# Patient Record
Sex: FEMALE | Race: WHITE | NOT HISPANIC OR LATINO | Employment: OTHER | URBAN - NONMETROPOLITAN AREA
[De-identification: names, ages, dates, MRNs, and addresses within clinical notes are randomized per-mention and may not be internally consistent; named-entity substitution may affect disease eponyms.]

---

## 2020-11-09 DIAGNOSIS — Z12.31 ENCOUNTER FOR SCREENING MAMMOGRAM FOR MALIGNANT NEOPLASM OF BREAST: Primary | ICD-10-CM

## 2021-03-01 DIAGNOSIS — Z12.31 ENCOUNTER FOR SCREENING MAMMOGRAM FOR MALIGNANT NEOPLASM OF BREAST: Primary | ICD-10-CM

## 2021-03-01 DIAGNOSIS — Z85.3 HISTORY OF BREAST CANCER: ICD-10-CM

## 2021-03-02 ENCOUNTER — HOSPITAL ENCOUNTER (OUTPATIENT)
Dept: RADIOLOGY | Facility: HOSPITAL | Age: 86
Discharge: HOME | End: 2021-03-02
Attending: INTERNAL MEDICINE
Payer: MEDICARE

## 2021-03-02 VITALS — HEIGHT: 66 IN | BODY MASS INDEX: 18.32 KG/M2 | WEIGHT: 114 LBS

## 2021-03-02 DIAGNOSIS — Z12.31 ENCOUNTER FOR SCREENING MAMMOGRAM FOR MALIGNANT NEOPLASM OF BREAST: ICD-10-CM

## 2021-03-02 DIAGNOSIS — Z85.3 HISTORY OF BREAST CANCER: ICD-10-CM

## 2021-03-02 PROCEDURE — 77067 SCR MAMMO BI INCL CAD: CPT | Mod: TC

## 2021-09-23 ENCOUNTER — HOSPITAL ENCOUNTER (OUTPATIENT)
Dept: RADIOLOGY | Facility: HOSPITAL | Age: 86
Discharge: HOME | End: 2021-09-23
Attending: INTERNAL MEDICINE
Payer: MEDICARE

## 2021-09-23 DIAGNOSIS — K59.00 CN (CONSTIPATION): ICD-10-CM

## 2021-09-23 DIAGNOSIS — K59.00 CN (CONSTIPATION): Primary | ICD-10-CM

## 2021-09-23 PROCEDURE — 74018 RADEX ABDOMEN 1 VIEW: CPT | Mod: TC

## 2021-09-27 ENCOUNTER — HOSPITAL ENCOUNTER (INPATIENT)
Facility: HOSPITAL | Age: 86
LOS: 4 days | Discharge: HOME HEALTH | DRG: 390 | End: 2021-10-01
Attending: INTERNAL MEDICINE | Admitting: INTERNAL MEDICINE
Payer: MEDICARE

## 2021-09-27 DIAGNOSIS — K56.41 FECAL IMPACTION: ICD-10-CM

## 2021-09-27 DIAGNOSIS — Z01.818 PRE-OP EVALUATION: ICD-10-CM

## 2021-09-27 DIAGNOSIS — K56.41 FECAL IMPACTION: Primary | ICD-10-CM

## 2021-09-27 LAB — SARS-COV-2 RDRP RESP QL NAA+PROBE: NEGATIVE

## 2021-09-27 PROCEDURE — U0002 COVID-19 LAB TEST NON-CDC: HCPCS | Performed by: INTERNAL MEDICINE

## 2021-09-27 PROCEDURE — 11000001 HC ACUTE MED/SURG PRIVATE ROOM

## 2021-09-27 PROCEDURE — 63600175 PHARM REV CODE 636 W HCPCS: Performed by: INTERNAL MEDICINE

## 2021-09-27 PROCEDURE — 25000003 PHARM REV CODE 250: Performed by: INTERNAL MEDICINE

## 2021-09-27 PROCEDURE — 25000003 PHARM REV CODE 250: Performed by: SURGERY

## 2021-09-27 RX ORDER — SYRING-NEEDL,DISP,INSUL,0.3 ML 29 G X1/2"
296 SYRINGE, EMPTY DISPOSABLE MISCELLANEOUS ONCE
Status: COMPLETED | OUTPATIENT
Start: 2021-09-27 | End: 2021-09-27

## 2021-09-27 RX ORDER — SODIUM CHLORIDE 9 MG/ML
INJECTION, SOLUTION INTRAVENOUS CONTINUOUS
Status: CANCELLED | OUTPATIENT
Start: 2021-09-27

## 2021-09-27 RX ORDER — POLYETHYLENE GLYCOL 3350 17 G/17G
17 POWDER, FOR SOLUTION ORAL DAILY
Status: CANCELLED | OUTPATIENT
Start: 2021-09-27

## 2021-09-27 RX ORDER — ACETAMINOPHEN 325 MG/1
650 TABLET ORAL EVERY 4 HOURS PRN
Status: DISCONTINUED | OUTPATIENT
Start: 2021-09-27 | End: 2021-10-01 | Stop reason: HOSPADM

## 2021-09-27 RX ORDER — ONDANSETRON 2 MG/ML
4 INJECTION INTRAMUSCULAR; INTRAVENOUS EVERY 8 HOURS PRN
Status: DISCONTINUED | OUTPATIENT
Start: 2021-09-27 | End: 2021-10-01 | Stop reason: HOSPADM

## 2021-09-27 RX ORDER — SODIUM CHLORIDE 9 MG/ML
INJECTION, SOLUTION INTRAVENOUS CONTINUOUS
Status: DISCONTINUED | OUTPATIENT
Start: 2021-09-27 | End: 2021-10-01 | Stop reason: HOSPADM

## 2021-09-27 RX ORDER — ROSUVASTATIN CALCIUM 5 MG/1
5 TABLET, COATED ORAL EVERY OTHER DAY
COMMUNITY
Start: 2020-11-19 | End: 2023-06-12

## 2021-09-27 RX ORDER — MORPHINE SULFATE 2 MG/ML
2 INJECTION, SOLUTION INTRAMUSCULAR; INTRAVENOUS EVERY 4 HOURS PRN
Status: DISCONTINUED | OUTPATIENT
Start: 2021-09-27 | End: 2021-09-28

## 2021-09-27 RX ORDER — METOPROLOL TARTRATE 25 MG/1
12.5 TABLET ORAL 2 TIMES DAILY
COMMUNITY
Start: 2020-11-19

## 2021-09-27 RX ORDER — POLYETHYLENE GLYCOL 3350 17 G/17G
17 POWDER, FOR SOLUTION ORAL DAILY
Status: DISCONTINUED | OUTPATIENT
Start: 2021-09-27 | End: 2021-09-27

## 2021-09-27 RX ORDER — MORPHINE SULFATE 2 MG/ML
2 INJECTION, SOLUTION INTRAMUSCULAR; INTRAVENOUS EVERY 4 HOURS PRN
Status: CANCELLED | OUTPATIENT
Start: 2021-09-27

## 2021-09-27 RX ORDER — ACETAMINOPHEN 325 MG/1
650 TABLET ORAL EVERY 4 HOURS PRN
Status: CANCELLED | OUTPATIENT
Start: 2021-09-27

## 2021-09-27 RX ORDER — ONDANSETRON 2 MG/ML
4 INJECTION INTRAMUSCULAR; INTRAVENOUS EVERY 8 HOURS PRN
Status: CANCELLED | OUTPATIENT
Start: 2021-09-27

## 2021-09-27 RX ADMIN — MAGNESIUM CITRATE 296 ML: 1.75 LIQUID ORAL at 07:09

## 2021-09-27 RX ADMIN — METOPROLOL SUCCINATE 12.5 MG: 25 TABLET, EXTENDED RELEASE ORAL at 10:09

## 2021-09-27 RX ADMIN — SODIUM CHLORIDE: 0.9 INJECTION, SOLUTION INTRAVENOUS at 03:09

## 2021-09-27 RX ADMIN — ONDANSETRON 4 MG: 2 INJECTION INTRAMUSCULAR; INTRAVENOUS at 11:09

## 2021-09-27 RX ADMIN — APIXABAN 2.5 MG: 2.5 TABLET, FILM COATED ORAL at 10:09

## 2021-09-27 NOTE — NURSING
Received report for SALOME Humphries. Patient laying in bed with NADN. Patient denies needs at this time. Will continue to monitor. CB in reach. Care assumed   No

## 2021-09-28 PROBLEM — N32.81 OAB (OVERACTIVE BLADDER): Status: ACTIVE | Noted: 2021-09-28

## 2021-09-28 PROBLEM — E78.5 HYPERLIPIDEMIA: Status: ACTIVE | Noted: 2021-09-28

## 2021-09-28 PROBLEM — Z79.899 DVT PROPHYLAXIS: Status: ACTIVE | Noted: 2021-09-28

## 2021-09-28 PROBLEM — G62.9 NEUROPATHY: Status: ACTIVE | Noted: 2021-09-28

## 2021-09-28 PROBLEM — I48.91 A-FIB: Status: ACTIVE | Noted: 2021-09-28

## 2021-09-28 PROBLEM — R53.1 WEAKNESS: Status: ACTIVE | Noted: 2021-09-28

## 2021-09-28 LAB
ALBUMIN SERPL BCP-MCNC: 3.5 G/DL (ref 3.5–5.2)
ALP SERPL-CCNC: 98 U/L (ref 55–135)
ALT SERPL W/O P-5'-P-CCNC: 20 U/L (ref 10–44)
ANION GAP SERPL CALC-SCNC: 10 MMOL/L (ref 8–16)
AST SERPL-CCNC: 21 U/L (ref 10–40)
BASOPHILS # BLD AUTO: 0.04 K/UL (ref 0–0.2)
BASOPHILS NFR BLD: 0.6 % (ref 0–1.9)
BILIRUB SERPL-MCNC: 0.7 MG/DL (ref 0.1–1)
BUN SERPL-MCNC: 7 MG/DL (ref 8–23)
CALCIUM SERPL-MCNC: 8.5 MG/DL (ref 8.7–10.5)
CHLORIDE SERPL-SCNC: 103 MMOL/L (ref 95–110)
CO2 SERPL-SCNC: 27 MMOL/L (ref 23–29)
CREAT SERPL-MCNC: 0.7 MG/DL (ref 0.5–1.4)
DIFFERENTIAL METHOD: ABNORMAL
EOSINOPHIL # BLD AUTO: 0.1 K/UL (ref 0–0.5)
EOSINOPHIL NFR BLD: 1.9 % (ref 0–8)
ERYTHROCYTE [DISTWIDTH] IN BLOOD BY AUTOMATED COUNT: 12.2 % (ref 11.5–14.5)
EST. GFR  (AFRICAN AMERICAN): >60 ML/MIN/1.73 M^2
EST. GFR  (NON AFRICAN AMERICAN): >60 ML/MIN/1.73 M^2
GLUCOSE SERPL-MCNC: 102 MG/DL (ref 70–110)
HCT VFR BLD AUTO: 37.1 % (ref 37–48.5)
HGB BLD-MCNC: 12.7 G/DL (ref 12–16)
IMM GRANULOCYTES # BLD AUTO: 0.02 K/UL (ref 0–0.04)
IMM GRANULOCYTES NFR BLD AUTO: 0.3 % (ref 0–0.5)
LYMPHOCYTES # BLD AUTO: 1.3 K/UL (ref 1–4.8)
LYMPHOCYTES NFR BLD: 18.6 % (ref 18–48)
MCH RBC QN AUTO: 31.5 PG (ref 27–31)
MCHC RBC AUTO-ENTMCNC: 34.2 G/DL (ref 32–36)
MCV RBC AUTO: 92 FL (ref 82–98)
MONOCYTES # BLD AUTO: 0.9 K/UL (ref 0.3–1)
MONOCYTES NFR BLD: 13 % (ref 4–15)
NEUTROPHILS # BLD AUTO: 4.6 K/UL (ref 1.8–7.7)
NEUTROPHILS NFR BLD: 65.6 % (ref 38–73)
NRBC BLD-RTO: 0 /100 WBC
PLATELET # BLD AUTO: 223 K/UL (ref 150–450)
PMV BLD AUTO: 9.5 FL (ref 9.2–12.9)
POTASSIUM SERPL-SCNC: 4.7 MMOL/L (ref 3.5–5.1)
PROT SERPL-MCNC: 7.2 G/DL (ref 6–8.4)
RBC # BLD AUTO: 4.03 M/UL (ref 4–5.4)
SODIUM SERPL-SCNC: 140 MMOL/L (ref 136–145)
WBC # BLD AUTO: 6.99 K/UL (ref 3.9–12.7)

## 2021-09-28 PROCEDURE — 25000003 PHARM REV CODE 250: Performed by: INTERNAL MEDICINE

## 2021-09-28 PROCEDURE — 80053 COMPREHEN METABOLIC PANEL: CPT | Performed by: INTERNAL MEDICINE

## 2021-09-28 PROCEDURE — 36415 COLL VENOUS BLD VENIPUNCTURE: CPT | Performed by: INTERNAL MEDICINE

## 2021-09-28 PROCEDURE — 63600175 PHARM REV CODE 636 W HCPCS: Performed by: INTERNAL MEDICINE

## 2021-09-28 PROCEDURE — 25000003 PHARM REV CODE 250: Performed by: NURSE PRACTITIONER

## 2021-09-28 PROCEDURE — 25000003 PHARM REV CODE 250: Performed by: SURGERY

## 2021-09-28 PROCEDURE — 11000001 HC ACUTE MED/SURG PRIVATE ROOM

## 2021-09-28 PROCEDURE — G0378 HOSPITAL OBSERVATION PER HR: HCPCS

## 2021-09-28 PROCEDURE — 85025 COMPLETE CBC W/AUTO DIFF WBC: CPT | Performed by: INTERNAL MEDICINE

## 2021-09-28 RX ORDER — BISACODYL 10 MG
10 SUPPOSITORY, RECTAL RECTAL ONCE
Status: COMPLETED | OUTPATIENT
Start: 2021-09-28 | End: 2021-09-28

## 2021-09-28 RX ORDER — ATORVASTATIN CALCIUM 20 MG/1
20 TABLET, FILM COATED ORAL NIGHTLY
Status: DISCONTINUED | OUTPATIENT
Start: 2021-09-28 | End: 2021-10-01 | Stop reason: HOSPADM

## 2021-09-28 RX ADMIN — ATORVASTATIN CALCIUM 20 MG: 20 TABLET, FILM COATED ORAL at 09:09

## 2021-09-28 RX ADMIN — ONDANSETRON 4 MG: 2 INJECTION INTRAMUSCULAR; INTRAVENOUS at 09:09

## 2021-09-28 RX ADMIN — SODIUM CHLORIDE: 0.9 INJECTION, SOLUTION INTRAVENOUS at 04:09

## 2021-09-28 RX ADMIN — APIXABAN 2.5 MG: 2.5 TABLET, FILM COATED ORAL at 09:09

## 2021-09-28 RX ADMIN — BISACODYL 10 MG: 10 SUPPOSITORY RECTAL at 01:09

## 2021-09-28 RX ADMIN — METOPROLOL SUCCINATE 12.5 MG: 25 TABLET, EXTENDED RELEASE ORAL at 09:09

## 2021-09-28 RX ADMIN — LINACLOTIDE 290 MCG: 145 CAPSULE, GELATIN COATED ORAL at 05:09

## 2021-09-28 RX ADMIN — SODIUM CHLORIDE: 0.9 INJECTION, SOLUTION INTRAVENOUS at 07:09

## 2021-09-28 NOTE — PLAN OF CARE
[facility] Surg 5th Floor  Discharge Assessment    Primary Care Provider: Se Payne Iii, MD     Discharge Assessment (most recent)     BRIEF DISCHARGE ASSESSMENT - 09/28/21 1051        Discharge Planning    Assessment Type Discharge Planning Brief Assessment     Resource/Environmental Concerns none     Support Systems Spouse/significant other     Equipment Currently Used at Home rollator;walker, rolling;commode     Current Living Arrangements home/apartment/condo     Patient/Family Anticipates Transition to home;home with family     Patient/Family Anticipated Services at Transition none     DME Needed Upon Discharge  none     Discharge Plan A Home with family     Discharge Plan B Home with family                 Discharge assessment completed.  Patient does not have any  needs at this time. Will follow as needed.

## 2021-09-28 NOTE — H&P (VIEW-ONLY)
[facility] S[facility] 5th Floor  General Surgery  Consult Note    Inpatient consult to General Surgery  Consult performed by: Lupillo Rosenberg MD  Consult ordered by: Se Payne III, MD  Reason for consult: Chronic constipation and fecal impaction  Assessment/Recommendations: 1. Chronic constipation and fecal impaction secondary to hypomotility of the colon, as evidenced by her problems for years and years and multiple different treatment trials.  At this time, and after reviewing her CT of the abdomen and pelvis, I am recommending that we proceed with osmotic laxatives and see how she responds to this challenge.  I will follow her with you and make further recommendations as we go.        Subjective:     Chief Complaint/Reason for Admission:  Chronic constipation and fecal impaction    History of Present Illness:  Mrs. Begrman is an 86-year-old female who has a very long history of severe chronic constipation for which she has seen multiple physicians and gastroenterologists through the years.  The patient has had multiple regimens given and applied with really not much improvement.  Her last colonoscopy was over 10 years ago.  The patient denies gross bleeding, fevers or severe abdominal pain.  I have been consulted regarding this problem and see what else we can do.    No current facility-administered medications on file prior to encounter.     Current Outpatient Medications on File Prior to Encounter   Medication Sig    apixaban (ELIQUIS) 2.5 mg Tab Take 2.5 mg by mouth 2 (two) times a day.    metoprolol tartrate (LOPRESSOR) 12.5 mg tablet Take 12.5 mg by mouth 2 (two) times a day.    rosuvastatin (CRESTOR) 5 MG tablet Take 5 mg by mouth every other day.       Review of patient's allergies indicates:  No Known Allergies    Past Medical History:   Diagnosis Date    Breast cancer 2008     Past Surgical History:   Procedure Laterality Date    BREAST BIOPSY Right     malignant 2008    HYSTERECTOMY       MASTECTOMY Right 2008    OOPHORECTOMY      one was removed     Family History     Problem Relation (Age of Onset)    Breast cancer Mother, Sister, Maternal Aunt    No Known Problems Father, Daughter, Maternal Uncle, Paternal Aunt, Paternal Uncle, Maternal Grandmother, Maternal Grandfather, Paternal Grandmother, Paternal Grandfather        Tobacco Use    Smoking status: Not on file   Substance and Sexual Activity    Alcohol use: Not on file    Drug use: Not on file    Sexual activity: Not on file     Review of Systems   Cardiovascular:        History of atrial fibrillation, on anticoagulants   Gastrointestinal: Positive for constipation. Negative for abdominal distention and anal bleeding.   All other systems reviewed and are negative.    Objective:     Vital Signs (Most Recent):  Temp: 97.6 °F (36.4 °C) (09/27/21 2117)  Pulse: 77 (09/27/21 2117)  Resp: 18 (09/27/21 2117)  BP: 130/60 (09/27/21 2117)  SpO2: 99 % (09/27/21 2117) Vital Signs (24h Range):  Temp:  [97.1 °F (36.2 °C)-97.6 °F (36.4 °C)] 97.6 °F (36.4 °C)  Pulse:  [76-77] 77  Resp:  [18-20] 18  SpO2:  [99 %] 99 %  BP: (130-153)/(60-69) 130/60     Weight: 53.1 kg (117 lb)  Body mass index is 19.47 kg/m².    No intake or output data in the 24 hours ending 09/27/21 2249    Physical Exam  Constitutional:       Appearance: Normal appearance.   HENT:      Head: Normocephalic.      Nose: Nose normal.      Mouth/Throat:      Mouth: Mucous membranes are moist.   Eyes:      Extraocular Movements: Extraocular movements intact.      Pupils: Pupils are equal, round, and reactive to light.   Cardiovascular:      Rate and Rhythm: Normal rate and regular rhythm.   Pulmonary:      Effort: Pulmonary effort is normal.      Breath sounds: Normal breath sounds.   Abdominal:      General: There is distension (Mild).      Palpations: Abdomen is soft. There is no mass.      Tenderness: There is no abdominal tenderness.   Musculoskeletal:         General: Normal range of  motion.      Cervical back: Normal range of motion.   Lymphadenopathy:      Cervical: No cervical adenopathy.   Skin:     General: Skin is warm and dry.      Coloration: Skin is not jaundiced.   Neurological:      General: No focal deficit present.      Mental Status: She is alert and oriented to person, place, and time.   Psychiatric:         Mood and Affect: Mood normal.         Behavior: Behavior normal.         Significant Labs:  All pertinent labs from the last 24 hours have been reviewed.    Significant Diagnostics:  I have reviewed all pertinent imaging results/findings within the past 24 hours.    Assessment/Plan:     Active Diagnoses:    Diagnosis Date Noted POA    Fecal impaction [K56.41] 09/27/2021 Yes      Problems Resolved During this Admission:       Thank you for your consult. As mentioned above, I will follow the patient with you and make further recommendations as we go.    Lupillo Rosenberg MD  General Surgery  [facility] 5th Floor

## 2021-09-28 NOTE — CONSULTS
[facility] 5th Floor  General Surgery  Consult Note    Inpatient consult to General Surgery  Consult performed by: Lupillo Rosenberg MD  Consult ordered by: Se Payne III, MD  Reason for consult: Chronic constipation and fecal impaction  Assessment/Recommendations: 1. Chronic constipation and fecal impaction secondary to hypomotility of the colon, as evidenced by her problems for years and years and multiple different treatment trials.  At this time, and after reviewing her CT of the abdomen and pelvis, I am recommending that we proceed with osmotic laxatives and see how she responds to this challenge.  I will follow her with you and make further recommendations as we go.        Subjective:     Chief Complaint/Reason for Admission:  Chronic constipation and fecal impaction    History of Present Illness:  Mrs. Bergman is an 86-year-old female who has a very long history of severe chronic constipation for which she has seen multiple physicians and gastroenterologists through the years.  The patient has had multiple regimens given and applied with really not much improvement.  Her last colonoscopy was over 10 years ago.  The patient denies gross bleeding, fevers or severe abdominal pain.  I have been consulted regarding this problem and see what else we can do.    No current facility-administered medications on file prior to encounter.     Current Outpatient Medications on File Prior to Encounter   Medication Sig    apixaban (ELIQUIS) 2.5 mg Tab Take 2.5 mg by mouth 2 (two) times a day.    metoprolol tartrate (LOPRESSOR) 12.5 mg tablet Take 12.5 mg by mouth 2 (two) times a day.    rosuvastatin (CRESTOR) 5 MG tablet Take 5 mg by mouth every other day.       Review of patient's allergies indicates:  No Known Allergies    Past Medical History:   Diagnosis Date    Breast cancer 2008     Past Surgical History:   Procedure Laterality Date    BREAST BIOPSY Right     malignant 2008    HYSTERECTOMY       MASTECTOMY Right 2008    OOPHORECTOMY      one was removed     Family History     Problem Relation (Age of Onset)    Breast cancer Mother, Sister, Maternal Aunt    No Known Problems Father, Daughter, Maternal Uncle, Paternal Aunt, Paternal Uncle, Maternal Grandmother, Maternal Grandfather, Paternal Grandmother, Paternal Grandfather        Tobacco Use    Smoking status: Not on file   Substance and Sexual Activity    Alcohol use: Not on file    Drug use: Not on file    Sexual activity: Not on file     Review of Systems   Cardiovascular:        History of atrial fibrillation, on anticoagulants   Gastrointestinal: Positive for constipation. Negative for abdominal distention and anal bleeding.   All other systems reviewed and are negative.    Objective:     Vital Signs (Most Recent):  Temp: 97.6 °F (36.4 °C) (09/27/21 2117)  Pulse: 77 (09/27/21 2117)  Resp: 18 (09/27/21 2117)  BP: 130/60 (09/27/21 2117)  SpO2: 99 % (09/27/21 2117) Vital Signs (24h Range):  Temp:  [97.1 °F (36.2 °C)-97.6 °F (36.4 °C)] 97.6 °F (36.4 °C)  Pulse:  [76-77] 77  Resp:  [18-20] 18  SpO2:  [99 %] 99 %  BP: (130-153)/(60-69) 130/60     Weight: 53.1 kg (117 lb)  Body mass index is 19.47 kg/m².    No intake or output data in the 24 hours ending 09/27/21 2249    Physical Exam  Constitutional:       Appearance: Normal appearance.   HENT:      Head: Normocephalic.      Nose: Nose normal.      Mouth/Throat:      Mouth: Mucous membranes are moist.   Eyes:      Extraocular Movements: Extraocular movements intact.      Pupils: Pupils are equal, round, and reactive to light.   Cardiovascular:      Rate and Rhythm: Normal rate and regular rhythm.   Pulmonary:      Effort: Pulmonary effort is normal.      Breath sounds: Normal breath sounds.   Abdominal:      General: There is distension (Mild).      Palpations: Abdomen is soft. There is no mass.      Tenderness: There is no abdominal tenderness.   Musculoskeletal:         General: Normal range of  motion.      Cervical back: Normal range of motion.   Lymphadenopathy:      Cervical: No cervical adenopathy.   Skin:     General: Skin is warm and dry.      Coloration: Skin is not jaundiced.   Neurological:      General: No focal deficit present.      Mental Status: She is alert and oriented to person, place, and time.   Psychiatric:         Mood and Affect: Mood normal.         Behavior: Behavior normal.         Significant Labs:  All pertinent labs from the last 24 hours have been reviewed.    Significant Diagnostics:  I have reviewed all pertinent imaging results/findings within the past 24 hours.    Assessment/Plan:     Active Diagnoses:    Diagnosis Date Noted POA    Fecal impaction [K56.41] 09/27/2021 Yes      Problems Resolved During this Admission:       Thank you for your consult. As mentioned above, I will follow the patient with you and make further recommendations as we go.    Lupillo Rosenberg MD  General Surgery  [facility] 5th Floor

## 2021-09-28 NOTE — HOSPITAL COURSE
9/28/21 LF patient was seen per General surgery last night, see progress note. Reports that she had nausea and vomiting last night after mag citrate and this morning is still having some nausea and has only has water no stool per rectum. She is also have weakness and oab and incontinence of urine. Abdomen is distended.  I verify that I have reviewed and concur with the findings of the Utilization Review Committee and that the identified patient does not meet requirements for Inpatient status as outlined by [employer] guidelines. Observation is the identified level of care appropriate for the patient.     9/29/21 LF seen per General surgery this am and she reports he would like a coloscopy, will defer to GS for more plans. Patient reports only passing liquids no stool. Will have family bring in motegrity and resume    9/30/21 LF patient still without bowel movement despite ambulation, ivf and medication. Dr Payne at bedside and speaking with General surgery plans to take to OR for colonscopy tomorrow am

## 2021-09-28 NOTE — SUBJECTIVE & OBJECTIVE
Past Medical History:   Diagnosis Date    Breast cancer 2008       Past Surgical History:   Procedure Laterality Date    BREAST BIOPSY Right     malignant 2008    HYSTERECTOMY      MASTECTOMY Right 2008    OOPHORECTOMY      one was removed       Review of patient's allergies indicates:  No Known Allergies    No current facility-administered medications on file prior to encounter.     Current Outpatient Medications on File Prior to Encounter   Medication Sig    apixaban (ELIQUIS) 2.5 mg Tab Take 2.5 mg by mouth 2 (two) times a day.    metoprolol tartrate (LOPRESSOR) 12.5 mg tablet Take 12.5 mg by mouth 2 (two) times a day.    rosuvastatin (CRESTOR) 5 MG tablet Take 5 mg by mouth every other day.     Family History     Problem Relation (Age of Onset)    Breast cancer Mother, Sister, Maternal Aunt    No Known Problems Father, Daughter, Maternal Uncle, Paternal Aunt, Paternal Uncle, Maternal Grandmother, Maternal Grandfather, Paternal Grandmother, Paternal Grandfather        Tobacco Use    Smoking status: Not on file   Substance and Sexual Activity    Alcohol use: Not on file    Drug use: Not on file    Sexual activity: Not on file     Review of Systems   Constitutional: Positive for activity change, appetite change and fatigue.   HENT: Negative.    Eyes: Negative.    Respiratory: Negative.    Cardiovascular: Negative.    Gastrointestinal: Positive for abdominal distention, abdominal pain, constipation, nausea and vomiting. Negative for blood in stool.   Genitourinary: Negative.    Musculoskeletal: Positive for arthralgias.   Skin: Negative.    Neurological: Positive for weakness and numbness.     Objective:     Vital Signs (Most Recent):  Temp: 97.4 °F (36.3 °C) (09/28/21 0707)  Pulse: 82 (09/28/21 0707)  Resp: 16 (09/28/21 0707)  BP: (!) 155/69 (09/28/21 0707)  SpO2: 97 % (09/28/21 0707) Vital Signs (24h Range):  Temp:  [97.1 °F (36.2 °C)-98 °F (36.7 °C)] 97.4 °F (36.3 °C)  Pulse:  [76-82] 82  Resp:   [16-20] 16  SpO2:  [96 %-99 %] 97 %  BP: (130-155)/(60-74) 155/69     Weight: 53.1 kg (117 lb)  Body mass index is 19.47 kg/m².    Physical Exam  HENT:      Head: Normocephalic.      Mouth/Throat:      Mouth: Mucous membranes are moist.   Cardiovascular:      Rate and Rhythm: Normal rate and regular rhythm.      Pulses: Normal pulses.   Pulmonary:      Effort: Pulmonary effort is normal.   Abdominal:      General: There is distension.      Tenderness: There is abdominal tenderness.   Musculoskeletal:      Right lower leg: No edema.      Left lower leg: No edema.   Neurological:      Mental Status: She is alert. Mental status is at baseline.             Significant Labs:   All pertinent labs within the past 24 hours have been reviewed.  CBC:   Recent Labs   Lab 09/28/21  0528   WBC 6.99   HGB 12.7   HCT 37.1        CMP:   Recent Labs   Lab 09/28/21  0528      K 4.7      CO2 27      BUN 7*   CREATININE 0.7   CALCIUM 8.5*   PROT 7.2   ALBUMIN 3.5   BILITOT 0.7   ALKPHOS 98   AST 21   ALT 20   ANIONGAP 10   EGFRNONAA >60.0     Magnesium: No results for input(s): MG in the last 48 hours.    Significant Imaging: CT of Abdomen and pelvis: 1. Right mastectomy   2. Staples in place from recent surgery with no evidence for bowel obstruction with significant stool in the right and transverse colon and contrast seen at the terminal ileum entering the ascending colon

## 2021-09-28 NOTE — NURSING
Complete assessment per flowsheet. Patient laying in bed wit NADN. Patient denies needs at this time. Will continue to monitor. CB in reach.

## 2021-09-28 NOTE — H&P
[facility] 5th Floor  [facility] Medicine  History & Physical    Patient Name: Precious Malone  MRN: [id number]  Patient Class: IP- Inpatient  Admission Date: 9/27/2021  Attending Physician: Se Payne III, MD   Primary Care Provider: Se Payne Iii, MD         Patient information was obtained from patient, past medical records and ER records.     Subjective:     Principal Problem:Fecal impaction    Chief Complaint: No chief complaint on file.       HPI: 85 YO WF presents to the clinic for a follow up of chronic conditions.  Patient's serology for causes of neuropathy at this point are unremarkable.  She is battling worsening constipation.  The patient has tried to take several stool softeners and not having good results. Patient was placed on a promotility agent one week ago and was encouraged to continue with fiber stool softeners and  increase water intake.  She is going to try an enema. She followed up at clinic yesterday for a one week follow up with Dr Payne and reported having She starting to have some dewayne and gravel like bowel movements.  Patient is also having distension pain weight loss and nausea.  The patient is requesting to be admitted to the hospital as she feels her symptoms are becoming more more severe and she is fearful.  She has had no bleeding. She was directly admitted to the hospital       Past Medical History:   Diagnosis Date    Breast cancer 2008       Past Surgical History:   Procedure Laterality Date    BREAST BIOPSY Right     malignant 2008    HYSTERECTOMY      MASTECTOMY Right 2008    OOPHORECTOMY      one was removed       Review of patient's allergies indicates:  No Known Allergies    No current facility-administered medications on file prior to encounter.     Current Outpatient Medications on File Prior to Encounter   Medication Sig    apixaban (ELIQUIS) 2.5 mg Tab Take 2.5 mg by mouth 2 (two) times a day.    metoprolol tartrate (LOPRESSOR) 12.5 mg tablet  Take 12.5 mg by mouth 2 (two) times a day.    rosuvastatin (CRESTOR) 5 MG tablet Take 5 mg by mouth every other day.     Family History     Problem Relation (Age of Onset)    Breast cancer Mother, Sister, Maternal Aunt    No Known Problems Father, Daughter, Maternal Uncle, Paternal Aunt, Paternal Uncle, Maternal Grandmother, Maternal Grandfather, Paternal Grandmother, Paternal Grandfather        Tobacco Use    Smoking status: Not on file   Substance and Sexual Activity    Alcohol use: Not on file    Drug use: Not on file    Sexual activity: Not on file     Review of Systems   Constitutional: Positive for activity change, appetite change and fatigue.   HENT: Negative.    Eyes: Negative.    Respiratory: Negative.    Cardiovascular: Negative.    Gastrointestinal: Positive for abdominal distention, abdominal pain, constipation, nausea and vomiting. Negative for blood in stool.   Genitourinary: Negative.    Musculoskeletal: Positive for arthralgias.   Skin: Negative.    Neurological: Positive for weakness and numbness.     Objective:     Vital Signs (Most Recent):  Temp: 97.4 °F (36.3 °C) (09/28/21 0707)  Pulse: 82 (09/28/21 0707)  Resp: 16 (09/28/21 0707)  BP: (!) 155/69 (09/28/21 0707)  SpO2: 97 % (09/28/21 0707) Vital Signs (24h Range):  Temp:  [97.1 °F (36.2 °C)-98 °F (36.7 °C)] 97.4 °F (36.3 °C)  Pulse:  [76-82] 82  Resp:  [16-20] 16  SpO2:  [96 %-99 %] 97 %  BP: (130-155)/(60-74) 155/69     Weight: 53.1 kg (117 lb)  Body mass index is 19.47 kg/m².    Physical Exam  HENT:      Head: Normocephalic.      Mouth/Throat:      Mouth: Mucous membranes are moist.   Cardiovascular:      Rate and Rhythm: Normal rate and regular rhythm.      Pulses: Normal pulses.   Pulmonary:      Effort: Pulmonary effort is normal.   Abdominal:      General: There is distension.      Tenderness: There is abdominal tenderness.   Musculoskeletal:      Right lower leg: No edema.      Left lower leg: No edema.   Neurological:      Mental  Status: She is alert. Mental status is at baseline.             Significant Labs:   All pertinent labs within the past 24 hours have been reviewed.  CBC:   Recent Labs   Lab 09/28/21  0528   WBC 6.99   HGB 12.7   HCT 37.1        CMP:   Recent Labs   Lab 09/28/21  0528      K 4.7      CO2 27      BUN 7*   CREATININE 0.7   CALCIUM 8.5*   PROT 7.2   ALBUMIN 3.5   BILITOT 0.7   ALKPHOS 98   AST 21   ALT 20   ANIONGAP 10   EGFRNONAA >60.0     Magnesium: No results for input(s): MG in the last 48 hours.    Significant Imaging: CT of Abdomen and pelvis: 1. Right mastectomy   2. Staples in place from recent surgery with no evidence for bowel obstruction with significant stool in the right and transverse colon and contrast seen at the terminal ileum entering the ascending colon     Assessment/Plan:     * Fecal impaction  General surgery consulted, CT of abdomen and pelvis done continue treatment, encourage ambulation, continue IVF--add dulcolax supp today      DVT prophylaxis  eliquis      Weakness  Pt eval and treat        OAB (overactive bladder)  purwick for now, continue OAB meds outpatient      Hyperlipidemia  Continue statin      A-fib  Rate controlled, continue cardiac monitor, on eliquis      Neuropathy  OT eval and treat        VTE Risk Mitigation (From admission, onward)         Ordered     apixaban tablet 2.5 mg  2 times daily         09/27/21 2053     IP VTE LOW RISK PATIENT  Once         09/27/21 1203     Place sequential compression device  Until discontinued         09/27/21 1203                   Jazmin Ashford NP  Department of Hospital Medicine   [facility] 5th Floor

## 2021-09-28 NOTE — PLAN OF CARE
Plan of care discussed with patient & pt's . Pt & pt's  verbalizes understanding of plan of care. Pt compliant with POC. VSS, NADN. Pt remains free from falls or injury. Pt denies SOB. IV fluids  administered per order. Scheduled medications administered. CB in reach, will continue to monitor.

## 2021-09-28 NOTE — ASSESSMENT & PLAN NOTE
General surgery consulted, CT of abdomen and pelvis done continue treatment, encourage ambulation, continue IVF--add dulcolax supp today

## 2021-09-28 NOTE — HPI
85 YO WF presents to the clinic for a follow up of chronic conditions.  Patient's serology for causes of neuropathy at this point are unremarkable.  She is battling worsening constipation.  The patient has tried to take several stool softeners and not having good results. Patient was placed on a promotility agent one week ago and was encouraged to continue with fiber stool softeners and  increase water intake.  She is going to try an enema. She followed up at clinic yesterday for a one week follow up with Dr Payne and reported having She starting to have some dewayne and gravel like bowel movements.  Patient is also having distension pain weight loss and nausea.  The patient is requesting to be admitted to the hospital as she feels her symptoms are becoming more more severe and she is fearful.  She has had no bleeding. She was directly admitted to the hospital

## 2021-09-29 LAB
ALBUMIN SERPL BCP-MCNC: 3.5 G/DL (ref 3.5–5.2)
ALP SERPL-CCNC: 99 U/L (ref 55–135)
ALT SERPL W/O P-5'-P-CCNC: 20 U/L (ref 10–44)
ANION GAP SERPL CALC-SCNC: 11 MMOL/L (ref 8–16)
AST SERPL-CCNC: 22 U/L (ref 10–40)
BASOPHILS # BLD AUTO: 0.05 K/UL (ref 0–0.2)
BASOPHILS NFR BLD: 0.6 % (ref 0–1.9)
BILIRUB SERPL-MCNC: 0.6 MG/DL (ref 0.1–1)
BUN SERPL-MCNC: 3 MG/DL (ref 8–23)
CALCIUM SERPL-MCNC: 8.7 MG/DL (ref 8.7–10.5)
CHLORIDE SERPL-SCNC: 104 MMOL/L (ref 95–110)
CO2 SERPL-SCNC: 27 MMOL/L (ref 23–29)
CREAT SERPL-MCNC: 0.7 MG/DL (ref 0.5–1.4)
DIFFERENTIAL METHOD: NORMAL
EOSINOPHIL # BLD AUTO: 0.2 K/UL (ref 0–0.5)
EOSINOPHIL NFR BLD: 2.2 % (ref 0–8)
ERYTHROCYTE [DISTWIDTH] IN BLOOD BY AUTOMATED COUNT: 12.4 % (ref 11.5–14.5)
EST. GFR  (AFRICAN AMERICAN): >60 ML/MIN/1.73 M^2
EST. GFR  (NON AFRICAN AMERICAN): >60 ML/MIN/1.73 M^2
GLUCOSE SERPL-MCNC: 116 MG/DL (ref 70–110)
HCT VFR BLD AUTO: 39.3 % (ref 37–48.5)
HGB BLD-MCNC: 13 G/DL (ref 12–16)
IMM GRANULOCYTES # BLD AUTO: 0.02 K/UL (ref 0–0.04)
IMM GRANULOCYTES NFR BLD AUTO: 0.3 % (ref 0–0.5)
LYMPHOCYTES # BLD AUTO: 1.8 K/UL (ref 1–4.8)
LYMPHOCYTES NFR BLD: 23.5 % (ref 18–48)
MAGNESIUM SERPL-MCNC: 2 MG/DL (ref 1.6–2.6)
MCH RBC QN AUTO: 31 PG (ref 27–31)
MCHC RBC AUTO-ENTMCNC: 33.1 G/DL (ref 32–36)
MCV RBC AUTO: 94 FL (ref 82–98)
MONOCYTES # BLD AUTO: 1 K/UL (ref 0.3–1)
MONOCYTES NFR BLD: 12.2 % (ref 4–15)
NEUTROPHILS # BLD AUTO: 4.8 K/UL (ref 1.8–7.7)
NEUTROPHILS NFR BLD: 61.2 % (ref 38–73)
NRBC BLD-RTO: 0 /100 WBC
PLATELET # BLD AUTO: 245 K/UL (ref 150–450)
PMV BLD AUTO: 9.4 FL (ref 9.2–12.9)
POTASSIUM SERPL-SCNC: 4 MMOL/L (ref 3.5–5.1)
PROT SERPL-MCNC: 7.2 G/DL (ref 6–8.4)
RBC # BLD AUTO: 4.19 M/UL (ref 4–5.4)
SODIUM SERPL-SCNC: 142 MMOL/L (ref 136–145)
WBC # BLD AUTO: 7.79 K/UL (ref 3.9–12.7)

## 2021-09-29 PROCEDURE — 25000003 PHARM REV CODE 250: Performed by: NURSE PRACTITIONER

## 2021-09-29 PROCEDURE — 80053 COMPREHEN METABOLIC PANEL: CPT | Performed by: NURSE PRACTITIONER

## 2021-09-29 PROCEDURE — 83735 ASSAY OF MAGNESIUM: CPT | Performed by: NURSE PRACTITIONER

## 2021-09-29 PROCEDURE — 97161 PT EVAL LOW COMPLEX 20 MIN: CPT

## 2021-09-29 PROCEDURE — 36415 COLL VENOUS BLD VENIPUNCTURE: CPT | Performed by: NURSE PRACTITIONER

## 2021-09-29 PROCEDURE — G0378 HOSPITAL OBSERVATION PER HR: HCPCS

## 2021-09-29 PROCEDURE — 85025 COMPLETE CBC W/AUTO DIFF WBC: CPT | Performed by: NURSE PRACTITIONER

## 2021-09-29 PROCEDURE — 97165 OT EVAL LOW COMPLEX 30 MIN: CPT

## 2021-09-29 PROCEDURE — 11000001 HC ACUTE MED/SURG PRIVATE ROOM

## 2021-09-29 RX ADMIN — LINACLOTIDE 290 MCG: 145 CAPSULE, GELATIN COATED ORAL at 05:09

## 2021-09-29 RX ADMIN — APIXABAN 2.5 MG: 2.5 TABLET, FILM COATED ORAL at 08:09

## 2021-09-29 RX ADMIN — SODIUM CHLORIDE: 0.9 INJECTION, SOLUTION INTRAVENOUS at 09:09

## 2021-09-29 RX ADMIN — METOPROLOL SUCCINATE 12.5 MG: 25 TABLET, EXTENDED RELEASE ORAL at 08:09

## 2021-09-29 RX ADMIN — SODIUM CHLORIDE: 0.9 INJECTION, SOLUTION INTRAVENOUS at 02:09

## 2021-09-29 RX ADMIN — ACETAMINOPHEN 650 MG: 325 TABLET ORAL at 08:09

## 2021-09-29 RX ADMIN — ACETAMINOPHEN 650 MG: 325 TABLET ORAL at 05:09

## 2021-09-29 RX ADMIN — SODIUM CHLORIDE: 0.9 INJECTION, SOLUTION INTRAVENOUS at 03:09

## 2021-09-29 RX ADMIN — SODIUM CHLORIDE: 0.9 INJECTION, SOLUTION INTRAVENOUS at 08:09

## 2021-09-29 NOTE — ASSESSMENT & PLAN NOTE
General surgery consulted, CT of abdomen and pelvis done continue treatment, encourage ambulation, continue IVF--add dulcolax supp today    9/29/21 LF patient reports only liquid per rectum no stool. General surgery follow defer for plans

## 2021-09-29 NOTE — SUBJECTIVE & OBJECTIVE
Past Medical History:   Diagnosis Date    Breast cancer 2008       Past Surgical History:   Procedure Laterality Date    BREAST BIOPSY Right     malignant 2008    HYSTERECTOMY      MASTECTOMY Right 2008    OOPHORECTOMY      one was removed       Review of patient's allergies indicates:  No Known Allergies    No current facility-administered medications on file prior to encounter.     Current Outpatient Medications on File Prior to Encounter   Medication Sig    apixaban (ELIQUIS) 2.5 mg Tab Take 2.5 mg by mouth 2 (two) times a day.    metoprolol tartrate (LOPRESSOR) 12.5 mg tablet Take 12.5 mg by mouth 2 (two) times a day.    rosuvastatin (CRESTOR) 5 MG tablet Take 5 mg by mouth every other day.     Family History     Problem Relation (Age of Onset)    Breast cancer Mother, Sister, Maternal Aunt    No Known Problems Father, Daughter, Maternal Uncle, Paternal Aunt, Paternal Uncle, Maternal Grandmother, Maternal Grandfather, Paternal Grandmother, Paternal Grandfather        Tobacco Use    Smoking status: Not on file   Substance and Sexual Activity    Alcohol use: Not on file    Drug use: Not on file    Sexual activity: Not on file     Review of Systems   Constitutional: Positive for activity change, appetite change and fatigue.   HENT: Negative.    Eyes: Negative.    Respiratory: Negative.    Cardiovascular: Negative.    Gastrointestinal: Positive for abdominal distention, abdominal pain, constipation, nausea and vomiting. Negative for blood in stool.   Genitourinary: Negative.    Musculoskeletal: Positive for arthralgias.   Skin: Negative.    Neurological: Positive for weakness and numbness.     Objective:     Vital Signs (Most Recent):  Temp: 98 °F (36.7 °C) (09/28/21 2022)  Pulse: 108 (09/29/21 0545)  Resp: 18 (09/28/21 2022)  BP: (!) 141/83 (09/28/21 2022)  SpO2: 96 % (09/29/21 0545) Vital Signs (24h Range):  Temp:  [97.9 °F (36.6 °C)-98 °F (36.7 °C)] 98 °F (36.7 °C)  Pulse:  [] 108  Resp:   [18-19] 18  SpO2:  [96 %] 96 %  BP: (141)/(66-83) 141/83     Weight: 53.1 kg (117 lb)  Body mass index is 19.47 kg/m².    Physical Exam  HENT:      Head: Normocephalic.      Mouth/Throat:      Mouth: Mucous membranes are moist.   Cardiovascular:      Rate and Rhythm: Normal rate and regular rhythm.      Pulses: Normal pulses.   Pulmonary:      Effort: Pulmonary effort is normal.   Abdominal:      General: There is distension.      Tenderness: There is abdominal tenderness.   Musculoskeletal:      Right lower leg: No edema.      Left lower leg: No edema.   Neurological:      Mental Status: She is alert. Mental status is at baseline.             Significant Labs:   All pertinent labs within the past 24 hours have been reviewed.  CBC:   Recent Labs   Lab 09/28/21  0528 09/29/21  0520   WBC 6.99 7.79   HGB 12.7 13.0   HCT 37.1 39.3    245     CMP:   Recent Labs   Lab 09/28/21 0528 09/29/21  0520    142   K 4.7 4.0    104   CO2 27 27    116*   BUN 7* 3*   CREATININE 0.7 0.7   CALCIUM 8.5* 8.7   PROT 7.2 7.2   ALBUMIN 3.5 3.5   BILITOT 0.7 0.6   ALKPHOS 98 99   AST 21 22   ALT 20 20   ANIONGAP 10 11   EGFRNONAA >60.0 >60.0     Magnesium:   Recent Labs   Lab 09/29/21  0520   MG 2.0       Significant Imaging: CT of Abdomen and pelvis: 1. Right mastectomy   2. Staples in place from recent surgery with no evidence for bowel obstruction with significant stool in the right and transverse colon and contrast seen at the terminal ileum entering the ascending colon

## 2021-09-29 NOTE — PT/OT/SLP EVAL
"Occupational Therapy   Evaluation and Discharge Note    Name: Precious Malone  MRN: [id number]  Admitting Diagnosis:  Fecal impaction   Recent Surgery: * No surgery found *      Recommendations:     Discharge Recommendations: home with home health  Discharge Equipment Recommendations:  none  Barriers to discharge:   none    Assessment:     Precious Malone is a 86 y.o. female with a medical diagnosis of Fecal impaction. At this time, patient is functioning at their prior level of function and does not require further acute OT services.     Plan:     During this hospitalization, patient does not require further acute OT services.  Please re-consult if situation changes.    · Plan of Care Reviewed with: patient    Subjective     Chief Complaint: " I have occasional sciatic nerve pain "  Patient/Family Comments/goals: To go home    Occupational Profile:  Living Environment: lives at home w/ . 5 steps to enter and patient has a handrail.  Previous level of function: Independent  Roles and Routines: IADL's  Equipment Used at home:  bedside commode, rollator, walker, rolling  Assistance upon Discharge: Upon discharge, patient will have assistance from     Pain/Comfort:  · Pain Rating 1: other (see comments) (occasional sciatic pain)    Patients cultural, spiritual, Anabaptism conflicts given the current situation:  no    Objective:     Communicated with: Nursing prior to session.  Patient found with bed in chair position with peripheral IV upon OT entry to room.    General Precautions: Standard, fall   Orthopedic Precautions:N/A   Braces: N/A  Respiratory Status:   · O2 Device (Oxygen Therapy): room air     Occupational Performance:    Bed Mobility:    · Patient completed Rolling/Turning to Left with  modified independence  · Patient completed Rolling/Turning to Right with modified independence  · Patient completed Scooting/Bridging with modified independence  · Patient completed Supine to Sit with modified " independence  · Patient completed Sit to Supine with modified independence    Functional Mobility/Transfers:  · Patient completed Sit <> Stand Transfer with modified independence  with  hand-held assist   · Functional Mobility: Requires extended time    Activities of Daily Living:  · Grooming: modified independence n/a  · Upper Body Dressing: modified independence n/a  · Lower Body Dressing: modified independence n/a  · Toileting: modified independence n/a    Cognitive/Visual Perceptual:  Cognitive/Psychosocial Skills:     -       Oriented to: Person, Place, Time and Situation   -       Follows Commands/attention:Follows multistep  commands  -       Safety awareness/insight to disability: intact     Physical Exam:  Balance:    -       Static sitting balance: good / dynamic sitting balance: good  -       Static standing balance: good / dynamic standing balance: good  Upper Extremity Range of Motion:     -       Right Upper Extremity: WFL  -       Left Upper Extremity: WFL  Upper Extremity Strength:    -       Right Upper Extremity: WFL  -       Left Upper Extremity: WFL   Strength:    -       Right Upper Extremity: WFL  -       Left Upper Extremity: WFL  Fine Motor Coordination:    -       Intact  Gross motor coordination:   WFL    AMPAC 6 Click ADL:  AMPAC Total Score: 24    Treatment & Education:    Performed initial evaluation and assessed adls as appropriate.  Education:    Patient left with bed in chair position with all lines intact and call button in reach    GOALS:   Multidisciplinary Problems     Occupational Therapy Goals     Not on file                History:     Past Medical History:   Diagnosis Date    Breast cancer 2008       Past Surgical History:   Procedure Laterality Date    BREAST BIOPSY Right     malignant 2008    HYSTERECTOMY      MASTECTOMY Right 2008    OOPHORECTOMY      one was removed       Time Tracking:     OT Date of Treatment: 09/29/21  OT Start Time: 0830  OT Stop Time:  0845  OT Total Time (min): 15 min    Billable Minutes:Evaluation 15 minutes    9/29/2021

## 2021-09-29 NOTE — PROGRESS NOTES
[facility] - Med Surg 5th Floor  [facility]  Progress Note    Patient Name: Precious Malone  MRN: [id number]  Patient Class: OP- Observation   Admission Date: 9/27/2021  Length of Stay: 1 days  Attending Physician: Se Payne III, MD  Primary Care Provider: Se Payne Iii, MD        Subjective:     Principal Problem:Fecal impaction        HPI:  85 YO WF presents to the clinic for a follow up of chronic conditions.  Patient's serology for causes of neuropathy at this point are unremarkable.  She is battling worsening constipation.  The patient has tried to take several stool softeners and not having good results. Patient was placed on a promotility agent one week ago and was encouraged to continue with fiber stool softeners and  increase water intake.  She is going to try an enema. She followed up at clinic yesterday for a one week follow up with Dr Payne and reported having She starting to have some dewayne and gravel like bowel movements.  Patient is also having distension pain weight loss and nausea.  The patient is requesting to be admitted to the hospital as she feels her symptoms are becoming more more severe and she is fearful.  She has had no bleeding. She was directly admitted to the hospital       Overview/Hospital Course:  9/28/21 LF patient was seen per General surgery last night, see progress note. Reports that she had nausea and vomiting last night after mag citrate and this morning is still having some nausea and has only has water no stool per rectum. She is also have weakness and oab and incontinence of urine. Abdomen is distended.  I verify that I have reviewed and concur with the findings of the Utilization Review Committee and that the identified patient does not meet requirements for Inpatient status as outlined by [employer] guidelines. Observation is the identified level of care appropriate for the patient.     9/29/21 LF seen per General surgery this am and she reports he would  like a coloscopy, will defer to GS for more plans. Patient reports only passing liquids no stool. Will have family bring in motegrity and resume      Past Medical History:   Diagnosis Date    Breast cancer 2008       Past Surgical History:   Procedure Laterality Date    BREAST BIOPSY Right     malignant 2008    HYSTERECTOMY      MASTECTOMY Right 2008    OOPHORECTOMY      one was removed       Review of patient's allergies indicates:  No Known Allergies    No current facility-administered medications on file prior to encounter.     Current Outpatient Medications on File Prior to Encounter   Medication Sig    apixaban (ELIQUIS) 2.5 mg Tab Take 2.5 mg by mouth 2 (two) times a day.    metoprolol tartrate (LOPRESSOR) 12.5 mg tablet Take 12.5 mg by mouth 2 (two) times a day.    rosuvastatin (CRESTOR) 5 MG tablet Take 5 mg by mouth every other day.     Family History     Problem Relation (Age of Onset)    Breast cancer Mother, Sister, Maternal Aunt    No Known Problems Father, Daughter, Maternal Uncle, Paternal Aunt, Paternal Uncle, Maternal Grandmother, Maternal Grandfather, Paternal Grandmother, Paternal Grandfather        Tobacco Use    Smoking status: Not on file   Substance and Sexual Activity    Alcohol use: Not on file    Drug use: Not on file    Sexual activity: Not on file     Review of Systems   Constitutional: Positive for activity change, appetite change and fatigue.   HENT: Negative.    Eyes: Negative.    Respiratory: Negative.    Cardiovascular: Negative.    Gastrointestinal: Positive for abdominal distention, abdominal pain, constipation, nausea and vomiting. Negative for blood in stool.   Genitourinary: Negative.    Musculoskeletal: Positive for arthralgias.   Skin: Negative.    Neurological: Positive for weakness and numbness.     Objective:     Vital Signs (Most Recent):  Temp: 98 °F (36.7 °C) (09/28/21 2022)  Pulse: 108 (09/29/21 0545)  Resp: 18 (09/28/21 2022)  BP: (!) 141/83 (09/28/21  2022)  SpO2: 96 % (09/29/21 0545) Vital Signs (24h Range):  Temp:  [97.9 °F (36.6 °C)-98 °F (36.7 °C)] 98 °F (36.7 °C)  Pulse:  [] 108  Resp:  [18-19] 18  SpO2:  [96 %] 96 %  BP: (141)/(66-83) 141/83     Weight: 53.1 kg (117 lb)  Body mass index is 19.47 kg/m².    Physical Exam  HENT:      Head: Normocephalic.      Mouth/Throat:      Mouth: Mucous membranes are moist.   Cardiovascular:      Rate and Rhythm: Normal rate and regular rhythm.      Pulses: Normal pulses.   Pulmonary:      Effort: Pulmonary effort is normal.   Abdominal:      General: There is distension.      Tenderness: There is abdominal tenderness.   Musculoskeletal:      Right lower leg: No edema.      Left lower leg: No edema.   Neurological:      Mental Status: She is alert. Mental status is at baseline.             Significant Labs:   All pertinent labs within the past 24 hours have been reviewed.  CBC:   Recent Labs   Lab 09/28/21  0528 09/29/21  0520   WBC 6.99 7.79   HGB 12.7 13.0   HCT 37.1 39.3    245     CMP:   Recent Labs   Lab 09/28/21  0528 09/29/21  0520    142   K 4.7 4.0    104   CO2 27 27    116*   BUN 7* 3*   CREATININE 0.7 0.7   CALCIUM 8.5* 8.7   PROT 7.2 7.2   ALBUMIN 3.5 3.5   BILITOT 0.7 0.6   ALKPHOS 98 99   AST 21 22   ALT 20 20   ANIONGAP 10 11   EGFRNONAA >60.0 >60.0     Magnesium:   Recent Labs   Lab 09/29/21  0520   MG 2.0       Significant Imaging: CT of Abdomen and pelvis: 1. Right mastectomy   2. Staples in place from recent surgery with no evidence for bowel obstruction with significant stool in the right and transverse colon and contrast seen at the terminal ileum entering the ascending colon       Assessment/Plan:      * Fecal impaction  General surgery consulted, CT of abdomen and pelvis done continue treatment, encourage ambulation, continue IVF--add dulcolax supp today    9/29/21 LF patient reports only liquid per rectum no stool. General surgery follow defer for plans      DVT  prophylaxis  eliquis      Weakness  Pt eval and treat        OAB (overactive bladder)  purwick for now, continue OAB meds outpatient    9/29/21 LF patient declined purwick      Hyperlipidemia  Continue statin      A-fib  Rate controlled, continue cardiac monitor, on eliquis      Neuropathy  OT eval and treat        VTE Risk Mitigation (From admission, onward)         Ordered     apixaban tablet 2.5 mg  2 times daily         09/27/21 2053     IP VTE LOW RISK PATIENT  Once         09/27/21 1203     Place sequential compression device  Until discontinued         09/27/21 1203                Discharge Planning   MICK:      Code Status: Full Code   Is the patient medically ready for discharge?:     Reason for patient still in hospital (select all that apply): Patient trending condition, Laboratory test, Treatment and PT / OT recommendations  Discharge Plan A: Home with family                  Jazmin Ashford NP  Department of Hospital Medicine   [facility] - Med Surg 5th Floor

## 2021-09-29 NOTE — PT/OT/SLP EVAL
Physical Therapy Evaluation and Discharge Note    Patient Name:  Precious Malone   MRN:  [id number]    Recommendations:     Discharge Recommendations:  home with home health   Discharge Equipment Recommendations: none   Barriers to discharge: None    Assessment:     Precious Malone is a 86 y.o. female admitted with a medical diagnosis of Fecal impaction. .  At this time, patient is functioning at their prior level of function and does not require further acute PT services. If there is a decrease in pt's current functional status, PT is to be re-consulted.    Recent Surgery: * No surgery found *      Plan:     During this hospitalization, patient does not require further acute PT services.  Please re-consult if situation changes.      Subjective     Chief Complaint: none stated  Patient/Family Comments/goals: return to home  Pain/Comfort:  · Pain Rating 1: other (see comments) (c/o occasional sciatic pain that was not present at eval)    Patients cultural, spiritual, Hoahaoism conflicts given the current situation:      Living Environment:  Pt lives with her  in a two-story home with 4 steps to enter.  Pt stated that she has a restroom and a place to sleep on the 1st story.  Prior to admission, patients level of function required ADs.  Equipment used at home: bedside commode, rollator, walker, rolling.  DME owned (not currently used): none.  Upon discharge, patient will have assistance from her .    Objective:     Communicated with pt prior to session.  Patient found supine with peripheral IV upon PT entry to room.    General Precautions: Standard, fall   Orthopedic Precautions:N/A   Braces: N/A   Respiratory Status:   · O2 Device (Oxygen Therapy): room air    Exams:  · Cognitive Exam:  Patient is oriented to Person, Place and Time  · Fine Motor Coordination:    · -       Intact  · Gross Motor Coordination:  WFL  · Postural Exam:  Patient presented with the following abnormalities:    · -       Rounded  shoulders  · -       Forward head  · Sensation:    · -       Impaired  light/touch B LEs  · Skin Integrity/Edema:      · -       Skin integrity: Visible skin intact  · -       Edema: None noted BLEs  · RLE ROM: WFL  · RLE Strength: WFL  · LLE ROM: WFL  · LLE Strength: WFL    Functional Mobility:  · Bed Mobility:     · Rolling Left:  modified independence  · Rolling Right: modified independence  · Scooting: modified independence  · Supine to Sit: modified independence  · Sit to Supine: modified independence  · Transfers:     · Sit to Stand:  modified independence with rolling walker  · Bed to Chair: modified independence with  rolling walker  using  Step Transfer  · Gait: Mod I 120' w/ RW    AM-PAC 6 CLICK MOBILITY  Total Score:24     AM-PAC 6 CLICK MOBILITY  Total Score:24     Patient left supine with all lines intact and call button in reach.    GOALS:   Multidisciplinary Problems     Physical Therapy Goals     Not on file                History:     Past Medical History:   Diagnosis Date    Breast cancer 2008       Past Surgical History:   Procedure Laterality Date    BREAST BIOPSY Right     malignant 2008    HYSTERECTOMY      MASTECTOMY Right 2008    OOPHORECTOMY      one was removed       Time Tracking:     PT Received On: 09/29/21  PT Start Time: 0805     PT Stop Time: 0815  PT Total Time (min): 10 min     Billable Minutes: Evaluation 10      09/29/2021

## 2021-09-30 PROBLEM — E87.6 HYPOKALEMIA: Status: ACTIVE | Noted: 2021-09-30

## 2021-09-30 LAB
ALBUMIN SERPL BCP-MCNC: 3.3 G/DL (ref 3.5–5.2)
ALP SERPL-CCNC: 91 U/L (ref 55–135)
ALT SERPL W/O P-5'-P-CCNC: 18 U/L (ref 10–44)
ANION GAP SERPL CALC-SCNC: 4 MMOL/L (ref 8–16)
AST SERPL-CCNC: 18 U/L (ref 10–40)
BASOPHILS # BLD AUTO: 0.06 K/UL (ref 0–0.2)
BASOPHILS NFR BLD: 1 % (ref 0–1.9)
BILIRUB SERPL-MCNC: 0.6 MG/DL (ref 0.1–1)
BUN SERPL-MCNC: 2 MG/DL (ref 8–23)
CALCIUM SERPL-MCNC: 8.6 MG/DL (ref 8.7–10.5)
CHLORIDE SERPL-SCNC: 106 MMOL/L (ref 95–110)
CO2 SERPL-SCNC: 29 MMOL/L (ref 23–29)
CREAT SERPL-MCNC: 0.4 MG/DL (ref 0.5–1.4)
DIFFERENTIAL METHOD: ABNORMAL
EOSINOPHIL # BLD AUTO: 0.2 K/UL (ref 0–0.5)
EOSINOPHIL NFR BLD: 2.9 % (ref 0–8)
ERYTHROCYTE [DISTWIDTH] IN BLOOD BY AUTOMATED COUNT: 12.3 % (ref 11.5–14.5)
EST. GFR  (AFRICAN AMERICAN): >60 ML/MIN/1.73 M^2
EST. GFR  (NON AFRICAN AMERICAN): >60 ML/MIN/1.73 M^2
GLUCOSE SERPL-MCNC: 106 MG/DL (ref 70–110)
HCT VFR BLD AUTO: 35.7 % (ref 37–48.5)
HGB BLD-MCNC: 11.9 G/DL (ref 12–16)
IMM GRANULOCYTES # BLD AUTO: 0.02 K/UL (ref 0–0.04)
IMM GRANULOCYTES NFR BLD AUTO: 0.3 % (ref 0–0.5)
LYMPHOCYTES # BLD AUTO: 1 K/UL (ref 1–4.8)
LYMPHOCYTES NFR BLD: 16.7 % (ref 18–48)
MAGNESIUM SERPL-MCNC: 2 MG/DL (ref 1.6–2.6)
MCH RBC QN AUTO: 31.2 PG (ref 27–31)
MCHC RBC AUTO-ENTMCNC: 33.3 G/DL (ref 32–36)
MCV RBC AUTO: 94 FL (ref 82–98)
MONOCYTES # BLD AUTO: 0.7 K/UL (ref 0.3–1)
MONOCYTES NFR BLD: 10.9 % (ref 4–15)
NEUTROPHILS # BLD AUTO: 4.2 K/UL (ref 1.8–7.7)
NEUTROPHILS NFR BLD: 68.2 % (ref 38–73)
NRBC BLD-RTO: 0 /100 WBC
PLATELET # BLD AUTO: 220 K/UL (ref 150–450)
PMV BLD AUTO: 9.5 FL (ref 9.2–12.9)
POTASSIUM SERPL-SCNC: 3.3 MMOL/L (ref 3.5–5.1)
PROT SERPL-MCNC: 6.6 G/DL (ref 6–8.4)
RBC # BLD AUTO: 3.82 M/UL (ref 4–5.4)
SODIUM SERPL-SCNC: 139 MMOL/L (ref 136–145)
WBC # BLD AUTO: 6.22 K/UL (ref 3.9–12.7)

## 2021-09-30 PROCEDURE — 93005 ELECTROCARDIOGRAM TRACING: CPT

## 2021-09-30 PROCEDURE — 85025 COMPLETE CBC W/AUTO DIFF WBC: CPT | Performed by: NURSE PRACTITIONER

## 2021-09-30 PROCEDURE — 25000003 PHARM REV CODE 250: Performed by: NURSE PRACTITIONER

## 2021-09-30 PROCEDURE — 80053 COMPREHEN METABOLIC PANEL: CPT | Performed by: NURSE PRACTITIONER

## 2021-09-30 PROCEDURE — 11000001 HC ACUTE MED/SURG PRIVATE ROOM

## 2021-09-30 PROCEDURE — 93010 ELECTROCARDIOGRAM REPORT: CPT | Mod: ,,, | Performed by: INTERNAL MEDICINE

## 2021-09-30 PROCEDURE — 83735 ASSAY OF MAGNESIUM: CPT | Performed by: NURSE PRACTITIONER

## 2021-09-30 PROCEDURE — 36415 COLL VENOUS BLD VENIPUNCTURE: CPT | Performed by: NURSE PRACTITIONER

## 2021-09-30 PROCEDURE — 93010 EKG 12-LEAD: ICD-10-PCS | Mod: ,,, | Performed by: INTERNAL MEDICINE

## 2021-09-30 PROCEDURE — G0378 HOSPITAL OBSERVATION PER HR: HCPCS

## 2021-09-30 RX ORDER — POTASSIUM CHLORIDE 20 MEQ/1
20 TABLET, EXTENDED RELEASE ORAL DAILY
Status: DISCONTINUED | OUTPATIENT
Start: 2021-09-30 | End: 2021-09-30

## 2021-09-30 RX ORDER — SODIUM, POTASSIUM,MAG SULFATES 17.5-3.13G
1 SOLUTION, RECONSTITUTED, ORAL ORAL SEE ADMIN INSTRUCTIONS
Status: DISCONTINUED | OUTPATIENT
Start: 2021-09-30 | End: 2021-10-01 | Stop reason: HOSPADM

## 2021-09-30 RX ORDER — POTASSIUM CHLORIDE 20 MEQ/1
20 TABLET, EXTENDED RELEASE ORAL DAILY
Status: DISCONTINUED | OUTPATIENT
Start: 2021-09-30 | End: 2021-10-01 | Stop reason: HOSPADM

## 2021-09-30 RX ADMIN — METOPROLOL SUCCINATE 12.5 MG: 25 TABLET, EXTENDED RELEASE ORAL at 09:09

## 2021-09-30 RX ADMIN — ATORVASTATIN CALCIUM 20 MG: 20 TABLET, FILM COATED ORAL at 09:09

## 2021-09-30 RX ADMIN — APIXABAN 2.5 MG: 2.5 TABLET, FILM COATED ORAL at 08:09

## 2021-09-30 RX ADMIN — ACETAMINOPHEN 650 MG: 325 TABLET ORAL at 02:09

## 2021-09-30 RX ADMIN — LINACLOTIDE 290 MCG: 145 CAPSULE, GELATIN COATED ORAL at 05:09

## 2021-09-30 RX ADMIN — ACETAMINOPHEN 650 MG: 325 TABLET ORAL at 06:09

## 2021-09-30 RX ADMIN — POTASSIUM CHLORIDE 20 MEQ: 1500 TABLET, EXTENDED RELEASE ORAL at 10:09

## 2021-09-30 RX ADMIN — APIXABAN 2.5 MG: 2.5 TABLET, FILM COATED ORAL at 09:09

## 2021-09-30 RX ADMIN — SODIUM CHLORIDE: 0.9 INJECTION, SOLUTION INTRAVENOUS at 01:09

## 2021-09-30 RX ADMIN — METOPROLOL SUCCINATE 12.5 MG: 25 TABLET, EXTENDED RELEASE ORAL at 08:09

## 2021-09-30 RX ADMIN — SODIUM CHLORIDE: 0.9 INJECTION, SOLUTION INTRAVENOUS at 04:09

## 2021-09-30 NOTE — ASSESSMENT & PLAN NOTE
annamarie for now, continue OAB meds outpatient    9/29/21 LF patient declined annamarie    9/30/21 LF decrease IVF

## 2021-09-30 NOTE — CONSULTS
09/30/21 1054   Post-Acute Status   Post-Acute Authorization Home Health   Home Health Status Referrals Sent   Hospital Resources/Appts/Education Provided Appointments scheduled and added to AVS   Discharge Delays None known at this time   Discharge Plan   Discharge Plan A Home with family;Home Health   Discharge Plan B Home with family;Home Health       Referral sent to [facility]

## 2021-09-30 NOTE — ASSESSMENT & PLAN NOTE
General surgery consulted, CT of abdomen and pelvis done continue treatment, encourage ambulation, continue IVF--add dulcolax supp today    9/29/21  patient reports only liquid per rectum no stool. General surgery follow defer for plans    9/30/21  general surgery to do colonscopy tomorrow am

## 2021-09-30 NOTE — PLAN OF CARE
Plan of care discussed with patient & pt's .  Pt & pt's  verbalizes understanding of plan of care. Pt compliant with POC. VSS, NADN. Pt remains free from falls or injury. Pt denies N/V, SOB. IV fluids administered per order. Scheduled & PRN administered. CB in reach, will continue to monitor.

## 2021-09-30 NOTE — SUBJECTIVE & OBJECTIVE
Past Medical History:   Diagnosis Date    Breast cancer 2008       Past Surgical History:   Procedure Laterality Date    BREAST BIOPSY Right     malignant 2008    HYSTERECTOMY      MASTECTOMY Right 2008    OOPHORECTOMY      one was removed       Review of patient's allergies indicates:  No Known Allergies    No current facility-administered medications on file prior to encounter.     Current Outpatient Medications on File Prior to Encounter   Medication Sig    apixaban (ELIQUIS) 2.5 mg Tab Take 2.5 mg by mouth 2 (two) times a day.    metoprolol tartrate (LOPRESSOR) 12.5 mg tablet Take 12.5 mg by mouth 2 (two) times a day.    rosuvastatin (CRESTOR) 5 MG tablet Take 5 mg by mouth every other day.     Family History     Problem Relation (Age of Onset)    Breast cancer Mother, Sister, Maternal Aunt    No Known Problems Father, Daughter, Maternal Uncle, Paternal Aunt, Paternal Uncle, Maternal Grandmother, Maternal Grandfather, Paternal Grandmother, Paternal Grandfather        Tobacco Use    Smoking status: Not on file   Substance and Sexual Activity    Alcohol use: Not on file    Drug use: Not on file    Sexual activity: Not on file     Review of Systems   Constitutional: Positive for activity change, appetite change and fatigue.   HENT: Negative.    Eyes: Negative.    Respiratory: Negative.    Cardiovascular: Negative.    Gastrointestinal: Positive for abdominal distention, abdominal pain, constipation, nausea and vomiting. Negative for blood in stool.   Genitourinary: Negative.    Musculoskeletal: Positive for arthralgias.   Skin: Negative.    Neurological: Positive for weakness and numbness.     Objective:     Vital Signs (Most Recent):  Temp: 97.6 °F (36.4 °C) (09/30/21 0755)  Pulse: 79 (09/30/21 0755)  Resp: 20 (09/30/21 0755)  BP: (!) 156/70 (09/30/21 0755)  SpO2: 97 % (09/30/21 0755) Vital Signs (24h Range):  Temp:  [96.2 °F (35.7 °C)-97.9 °F (36.6 °C)] 97.6 °F (36.4 °C)  Pulse:  [75-91] 79  Resp:   [18-20] 20  SpO2:  [94 %-99 %] 97 %  BP: (132-163)/(60-77) 156/70     Weight: 53.1 kg (117 lb)  Body mass index is 19.47 kg/m².    Physical Exam  HENT:      Head: Normocephalic.      Mouth/Throat:      Mouth: Mucous membranes are moist.   Cardiovascular:      Rate and Rhythm: Normal rate and regular rhythm.      Pulses: Normal pulses.   Pulmonary:      Effort: Pulmonary effort is normal.   Abdominal:      General: There is distension.      Tenderness: There is abdominal tenderness.   Musculoskeletal:      Right lower leg: No edema.      Left lower leg: No edema.   Neurological:      Mental Status: She is alert. Mental status is at baseline.             Significant Labs:   All pertinent labs within the past 24 hours have been reviewed.  CBC:   Recent Labs   Lab 09/29/21  0520 09/30/21  0517   WBC 7.79 6.22   HGB 13.0 11.9*   HCT 39.3 35.7*    220     CMP:   Recent Labs   Lab 09/29/21  0520 09/30/21  0517    139   K 4.0 3.3*    106   CO2 27 29   * 106   BUN 3* 2*   CREATININE 0.7 0.4*   CALCIUM 8.7 8.6*   PROT 7.2 6.6   ALBUMIN 3.5 3.3*   BILITOT 0.6 0.6   ALKPHOS 99 91   AST 22 18   ALT 20 18   ANIONGAP 11 4*   EGFRNONAA >60.0 >60.0     Magnesium:   Recent Labs   Lab 09/29/21  0520 09/30/21  0517   MG 2.0 2.0       Significant Imaging: CT of Abdomen and pelvis: 1. Right mastectomy   2. Staples in place from recent surgery with no evidence for bowel obstruction with significant stool in the right and transverse colon and contrast seen at the terminal ileum entering the ascending colon

## 2021-09-30 NOTE — PROGRESS NOTES
[facility] - [facility] Surg 5th Floor  [facility] Medicine  Progress Note    Patient Name: Precious Malone  MRN: [id number]  Patient Class: OP- Observation   Admission Date: 9/27/2021  Length of Stay: 1 days  Attending Physician: Se Payne III, MD  Primary Care Provider: Se Payne Iii, MD        Subjective:     Principal Problem:Fecal impaction        HPI:  87 YO WF presents to the clinic for a follow up of chronic conditions.  Patient's serology for causes of neuropathy at this point are unremarkable.  She is battling worsening constipation.  The patient has tried to take several stool softeners and not having good results. Patient was placed on a promotility agent one week ago and was encouraged to continue with fiber stool softeners and  increase water intake.  She is going to try an enema. She followed up at clinic yesterday for a one week follow up with Dr Payne and reported having She starting to have some dewayne and gravel like bowel movements.  Patient is also having distension pain weight loss and nausea.  The patient is requesting to be admitted to the hospital as she feels her symptoms are becoming more more severe and she is fearful.  She has had no bleeding. She was directly admitted to the hospital       Overview/Hospital Course:  9/28/21 LF patient was seen per General surgery last night, see progress note. Reports that she had nausea and vomiting last night after mag citrate and this morning is still having some nausea and has only has water no stool per rectum. She is also have weakness and oab and incontinence of urine. Abdomen is distended.  I verify that I have reviewed and concur with the findings of the Utilization Review Committee and that the identified patient does not meet requirements for Inpatient status as outlined by [employer] guidelines. Observation is the identified level of care appropriate for the patient.     9/29/21 LF seen per General surgery this am and she reports he would  like a coloscopy, will defer to GS for more plans. Patient reports only passing liquids no stool. Will have family bring in motegrity and resume    9/30/21 LF patient still without bowel movement despite ambulation, ivf and medication. Dr Payne at bedside and speaking with General surgery plans to take to OR for colonscopy tomorrow am      Past Medical History:   Diagnosis Date    Breast cancer 2008       Past Surgical History:   Procedure Laterality Date    BREAST BIOPSY Right     malignant 2008    HYSTERECTOMY      MASTECTOMY Right 2008    OOPHORECTOMY      one was removed       Review of patient's allergies indicates:  No Known Allergies    No current facility-administered medications on file prior to encounter.     Current Outpatient Medications on File Prior to Encounter   Medication Sig    apixaban (ELIQUIS) 2.5 mg Tab Take 2.5 mg by mouth 2 (two) times a day.    metoprolol tartrate (LOPRESSOR) 12.5 mg tablet Take 12.5 mg by mouth 2 (two) times a day.    rosuvastatin (CRESTOR) 5 MG tablet Take 5 mg by mouth every other day.     Family History     Problem Relation (Age of Onset)    Breast cancer Mother, Sister, Maternal Aunt    No Known Problems Father, Daughter, Maternal Uncle, Paternal Aunt, Paternal Uncle, Maternal Grandmother, Maternal Grandfather, Paternal Grandmother, Paternal Grandfather        Tobacco Use    Smoking status: Not on file   Substance and Sexual Activity    Alcohol use: Not on file    Drug use: Not on file    Sexual activity: Not on file     Review of Systems   Constitutional: Positive for activity change, appetite change and fatigue.   HENT: Negative.    Eyes: Negative.    Respiratory: Negative.    Cardiovascular: Negative.    Gastrointestinal: Positive for abdominal distention, abdominal pain, constipation, nausea and vomiting. Negative for blood in stool.   Genitourinary: Negative.    Musculoskeletal: Positive for arthralgias.   Skin: Negative.    Neurological: Positive for  weakness and numbness.     Objective:     Vital Signs (Most Recent):  Temp: 97.6 °F (36.4 °C) (09/30/21 0755)  Pulse: 79 (09/30/21 0755)  Resp: 20 (09/30/21 0755)  BP: (!) 156/70 (09/30/21 0755)  SpO2: 97 % (09/30/21 0755) Vital Signs (24h Range):  Temp:  [96.2 °F (35.7 °C)-97.9 °F (36.6 °C)] 97.6 °F (36.4 °C)  Pulse:  [75-91] 79  Resp:  [18-20] 20  SpO2:  [94 %-99 %] 97 %  BP: (132-163)/(60-77) 156/70     Weight: 53.1 kg (117 lb)  Body mass index is 19.47 kg/m².    Physical Exam  HENT:      Head: Normocephalic.      Mouth/Throat:      Mouth: Mucous membranes are moist.   Cardiovascular:      Rate and Rhythm: Normal rate and regular rhythm.      Pulses: Normal pulses.   Pulmonary:      Effort: Pulmonary effort is normal.   Abdominal:      General: There is distension.      Tenderness: There is abdominal tenderness.   Musculoskeletal:      Right lower leg: No edema.      Left lower leg: No edema.   Neurological:      Mental Status: She is alert. Mental status is at baseline.             Significant Labs:   All pertinent labs within the past 24 hours have been reviewed.  CBC:   Recent Labs   Lab 09/29/21  0520 09/30/21  0517   WBC 7.79 6.22   HGB 13.0 11.9*   HCT 39.3 35.7*    220     CMP:   Recent Labs   Lab 09/29/21  0520 09/30/21  0517    139   K 4.0 3.3*    106   CO2 27 29   * 106   BUN 3* 2*   CREATININE 0.7 0.4*   CALCIUM 8.7 8.6*   PROT 7.2 6.6   ALBUMIN 3.5 3.3*   BILITOT 0.6 0.6   ALKPHOS 99 91   AST 22 18   ALT 20 18   ANIONGAP 11 4*   EGFRNONAA >60.0 >60.0     Magnesium:   Recent Labs   Lab 09/29/21  0520 09/30/21  0517   MG 2.0 2.0       Significant Imaging: CT of Abdomen and pelvis: 1. Right mastectomy   2. Staples in place from recent surgery with no evidence for bowel obstruction with significant stool in the right and transverse colon and contrast seen at the terminal ileum entering the ascending colon       Assessment/Plan:      * Fecal impaction  General surgery consulted,  CT of abdomen and pelvis done continue treatment, encourage ambulation, continue IVF--add dulcolax supp today    9/29/21  patient reports only liquid per rectum no stool. General surgery follow defer for plans    9/30/21  general surgery to do colonscopy tomorrow am      Hypokalemia  Replace and monitor.       DVT prophylaxis  eliquis      Weakness  Pt eval and treat        OAB (overactive bladder)  purwick for now, continue OAB meds outpatient    9/29/21  patient declined purwick    9/30/21  decrease IVF      Hyperlipidemia  Continue statin      A-fib  Rate controlled, continue cardiac monitor, on eliquis      Neuropathy  OT eval and treat        VTE Risk Mitigation (From admission, onward)         Ordered     apixaban tablet 2.5 mg  2 times daily         09/27/21 2053     IP VTE LOW RISK PATIENT  Once         09/27/21 1203     Place sequential compression device  Until discontinued         09/27/21 1203                Discharge Planning   MICK:      Code Status: Full Code   Is the patient medically ready for discharge?:     Reason for patient still in hospital (select all that apply): Patient trending condition, Laboratory test and Treatment  Discharge Plan A: Home with family                  Jazmin Ashford NP  Department of Hospital Medicine   [facility] Surg 5th Floor

## 2021-10-01 VITALS
SYSTOLIC BLOOD PRESSURE: 101 MMHG | HEART RATE: 95 BPM | HEIGHT: 65 IN | BODY MASS INDEX: 19.49 KG/M2 | DIASTOLIC BLOOD PRESSURE: 67 MMHG | OXYGEN SATURATION: 98 % | WEIGHT: 117 LBS | TEMPERATURE: 98 F | RESPIRATION RATE: 20 BRPM

## 2021-10-01 DIAGNOSIS — K56.41 FECAL IMPACTION: Primary | ICD-10-CM

## 2021-10-01 LAB
ALBUMIN SERPL BCP-MCNC: 3.3 G/DL (ref 3.5–5.2)
ALP SERPL-CCNC: 95 U/L (ref 55–135)
ALT SERPL W/O P-5'-P-CCNC: 18 U/L (ref 10–44)
ANION GAP SERPL CALC-SCNC: 6 MMOL/L (ref 8–16)
AST SERPL-CCNC: 15 U/L (ref 10–40)
BASOPHILS # BLD AUTO: 0.04 K/UL (ref 0–0.2)
BASOPHILS NFR BLD: 0.5 % (ref 0–1.9)
BILIRUB SERPL-MCNC: 0.5 MG/DL (ref 0.1–1)
BUN SERPL-MCNC: 3 MG/DL (ref 8–23)
CALCIUM SERPL-MCNC: 8.7 MG/DL (ref 8.7–10.5)
CHLORIDE SERPL-SCNC: 104 MMOL/L (ref 95–110)
CO2 SERPL-SCNC: 28 MMOL/L (ref 23–29)
CREAT SERPL-MCNC: 0.5 MG/DL (ref 0.5–1.4)
DIFFERENTIAL METHOD: ABNORMAL
EOSINOPHIL # BLD AUTO: 0.1 K/UL (ref 0–0.5)
EOSINOPHIL NFR BLD: 1.7 % (ref 0–8)
ERYTHROCYTE [DISTWIDTH] IN BLOOD BY AUTOMATED COUNT: 12.1 % (ref 11.5–14.5)
EST. GFR  (AFRICAN AMERICAN): >60 ML/MIN/1.73 M^2
EST. GFR  (NON AFRICAN AMERICAN): >60 ML/MIN/1.73 M^2
GLUCOSE SERPL-MCNC: 132 MG/DL (ref 70–110)
HCT VFR BLD AUTO: 36.8 % (ref 37–48.5)
HGB BLD-MCNC: 12.6 G/DL (ref 12–16)
IMM GRANULOCYTES # BLD AUTO: 0.02 K/UL (ref 0–0.04)
IMM GRANULOCYTES NFR BLD AUTO: 0.3 % (ref 0–0.5)
LYMPHOCYTES # BLD AUTO: 1.3 K/UL (ref 1–4.8)
LYMPHOCYTES NFR BLD: 16.3 % (ref 18–48)
MAGNESIUM SERPL-MCNC: 2.1 MG/DL (ref 1.6–2.6)
MCH RBC QN AUTO: 31.4 PG (ref 27–31)
MCHC RBC AUTO-ENTMCNC: 34.2 G/DL (ref 32–36)
MCV RBC AUTO: 92 FL (ref 82–98)
MONOCYTES # BLD AUTO: 0.7 K/UL (ref 0.3–1)
MONOCYTES NFR BLD: 9.5 % (ref 4–15)
NEUTROPHILS # BLD AUTO: 5.6 K/UL (ref 1.8–7.7)
NEUTROPHILS NFR BLD: 71.7 % (ref 38–73)
NRBC BLD-RTO: 0 /100 WBC
PLATELET # BLD AUTO: 227 K/UL (ref 150–450)
PMV BLD AUTO: 9.6 FL (ref 9.2–12.9)
POTASSIUM SERPL-SCNC: 3.6 MMOL/L (ref 3.5–5.1)
PROT SERPL-MCNC: 6.7 G/DL (ref 6–8.4)
RBC # BLD AUTO: 4.01 M/UL (ref 4–5.4)
SODIUM SERPL-SCNC: 138 MMOL/L (ref 136–145)
WBC # BLD AUTO: 7.79 K/UL (ref 3.9–12.7)

## 2021-10-01 PROCEDURE — 90471 IMMUNIZATION ADMIN: CPT | Performed by: NURSE PRACTITIONER

## 2021-10-01 PROCEDURE — 90694 VACC AIIV4 NO PRSRV 0.5ML IM: CPT | Performed by: NURSE PRACTITIONER

## 2021-10-01 PROCEDURE — G0378 HOSPITAL OBSERVATION PER HR: HCPCS

## 2021-10-01 PROCEDURE — 36415 COLL VENOUS BLD VENIPUNCTURE: CPT | Performed by: NURSE PRACTITIONER

## 2021-10-01 PROCEDURE — 25000003 PHARM REV CODE 250: Performed by: NURSE PRACTITIONER

## 2021-10-01 PROCEDURE — G0008 ADMIN INFLUENZA VIRUS VAC: HCPCS | Performed by: NURSE PRACTITIONER

## 2021-10-01 PROCEDURE — 83735 ASSAY OF MAGNESIUM: CPT | Performed by: NURSE PRACTITIONER

## 2021-10-01 PROCEDURE — 80053 COMPREHEN METABOLIC PANEL: CPT | Performed by: NURSE PRACTITIONER

## 2021-10-01 PROCEDURE — 63600175 PHARM REV CODE 636 W HCPCS: Performed by: NURSE PRACTITIONER

## 2021-10-01 PROCEDURE — 85025 COMPLETE CBC W/AUTO DIFF WBC: CPT | Performed by: NURSE PRACTITIONER

## 2021-10-01 RX ORDER — SODIUM, POTASSIUM,MAG SULFATES 17.5-3.13G
1 SOLUTION, RECONSTITUTED, ORAL ORAL SEE ADMIN INSTRUCTIONS
Qty: 1 KIT | Refills: 0 | Status: ON HOLD
Start: 2021-10-01 | End: 2021-10-04 | Stop reason: HOSPADM

## 2021-10-01 RX ORDER — LIDOCAINE HYDROCHLORIDE 10 MG/ML
1 INJECTION, SOLUTION EPIDURAL; INFILTRATION; INTRACAUDAL; PERINEURAL ONCE
Status: CANCELLED | OUTPATIENT
Start: 2021-10-01 | End: 2021-10-01

## 2021-10-01 RX ORDER — SODIUM CHLORIDE 0.9 % (FLUSH) 0.9 %
10 SYRINGE (ML) INJECTION
Status: CANCELLED | OUTPATIENT
Start: 2021-10-01

## 2021-10-01 RX ORDER — SODIUM CHLORIDE 9 MG/ML
INJECTION, SOLUTION INTRAVENOUS CONTINUOUS
Status: CANCELLED | OUTPATIENT
Start: 2021-10-04

## 2021-10-01 RX ADMIN — INFLUENZA VACCINE, ADJUVANTED 0.5 ML: 15; 15; 15; 15 INJECTION, SUSPENSION INTRAMUSCULAR at 02:10

## 2021-10-01 RX ADMIN — SODIUM CHLORIDE: 0.9 INJECTION, SOLUTION INTRAVENOUS at 03:10

## 2021-10-01 RX ADMIN — LINACLOTIDE 290 MCG: 145 CAPSULE, GELATIN COATED ORAL at 05:10

## 2021-10-01 RX ADMIN — POTASSIUM CHLORIDE 20 MEQ: 1500 TABLET, EXTENDED RELEASE ORAL at 08:10

## 2021-10-01 RX ADMIN — METOPROLOL SUCCINATE 12.5 MG: 25 TABLET, EXTENDED RELEASE ORAL at 08:10

## 2021-10-01 RX ADMIN — ACETAMINOPHEN 650 MG: 325 TABLET ORAL at 03:10

## 2021-10-01 NOTE — PLAN OF CARE
"   10/01/21 1528   Discharge Assessment   Assessment Type Final Discharge Note   Source of Information patient;family   Lives With spouse   Do you expect to return to your current living situation? Yes   Do you have help at home or someone to help you manage your care at home? Yes   Who are your caregiver(s) and their phone number(s)? Adams () and Vick (son) is here visiting   Current cognitive status: Alert/Oriented   Equipment Currently Used at Home walker, standard;rollator   Do you take prescription medications? Yes   Do you have prescription coverage? Yes   Do you have any problems affording any of your prescribed medications? No   Who is going to help you get home at discharge? Adams ()   How do you get to doctors appointments? family or friend will provide   Discharge Plan A Home;Home with family   Discharge Plan B Home;Home with family   DME Needed Upon Discharge  none   Discharge Plan discussed with: Spouse/sig other;Patient   Discharge Barriers Identified None       Discharge instructions, medications, upcoming appointments, help at home and transportation to/from appointments/home today.  Opportunity given to patient to ask questions but she had none.  "Connecting with Your [facility]" pamphlet given to patient and encouraged patient to contact SS/CM with any post discharge needs/concerns.  "

## 2021-10-01 NOTE — ASSESSMENT & PLAN NOTE
Rate controlled, continue cardiac monitor, on eliquis    10/1/21 LF hold eliquis until after colonscopy

## 2021-10-01 NOTE — PLAN OF CARE
Discharge criteria met. Discharge instructions reviewed and given to patient and spouse at bedside. Patient to follow clear liquid diet on Sunday in anticipation for colonoscopy on Monday. Patient to stop by PCP office for linzess samples and pharmacy for bowel prep. Patient voiced understanding.

## 2021-10-01 NOTE — PLAN OF CARE
[facility] - Med Surg 5th Floor  Discharge Final Note    Primary Care Provider: Se Payne Iii, MD    Expected Discharge Date: 10/1/2021    Final Discharge Note (most recent)     Final Note - 10/01/21 1415        Final Note    Assessment Type Final Discharge Note     Anticipated Discharge Disposition Home or Self Care     What phone number can be called within the next 1-3 days to see how you are doing after discharge? [phone]     Hospital Resources/Appts/Education Provided Appointments scheduled and added to AVS        Post-Acute Status    Home Health Status Set-up Complete/Auth obtained   [facility]    Discharge Delays None known at this time                 Important Message from Medicare             Contact Info            Se Payne Iii, MD   Specialty: Internal Medicine   Relationship: PCP - General    [address] [place] [id number]   Phone: [phone]       Next Steps: Follow up in 2 week(s)    Instructions: hospital follow up

## 2021-10-01 NOTE — PROGRESS NOTES
This note has been moved to another encounter. If you have any questions, please contact HIM Chart Correction at [phone].

## 2021-10-01 NOTE — PLAN OF CARE
POC reviewed and followed, Pt VU  No falls this shift  No fevers this shift  NS continuous fluid maintained  Let pt know if need anything to use call bell within reach

## 2021-10-01 NOTE — PHARMACY MED REC
Requested sodium/potassium/mag sulfate medication from Pharmacy for pt. Mrs. Mcneill stated that she will pass the note the pharmacy in the morning.

## 2021-10-01 NOTE — DISCHARGE SUMMARY
[facility] - [facility] S[facility] 5th Floor  Hospital Medicine  Discharge Summary      Patient Name: Precious Malone  MRN: [id number]  Patient Class: OP- Observation  Admission Date: 9/27/2021  Hospital Length of Stay: 1 days  Discharge Date and Time:  10/01/2021 1:09 PM  Attending Physician: Se Payne III, MD   Discharging Provider: Jazmin Nicholson NP  Primary Care Provider: Se Payne Iii, MD      HPI:   85 YO WF presents to the clinic for a follow up of chronic conditions.  Patient's serology for causes of neuropathy at this point are unremarkable.  She is battling worsening constipation.  The patient has tried to take several stool softeners and not having good results. Patient was placed on a promotility agent one week ago and was encouraged to continue with fiber stool softeners and  increase water intake.  She is going to try an enema. She followed up at clinic yesterday for a one week follow up with Dr Payne and reported having She starting to have some dewayne and gravel like bowel movements.  Patient is also having distension pain weight loss and nausea.  The patient is requesting to be admitted to the hospital as she feels her symptoms are becoming more more severe and she is fearful.  She has had no bleeding. She was directly admitted to the hospital       Procedure(s) (LRB):  COLONOSCOPY (N/A)      Hospital Course:   9/28/21 LF patient was seen per General surgery last night, see progress note. Reports that she had nausea and vomiting last night after mag citrate and this morning is still having some nausea and has only has water no stool per rectum. She is also have weakness and oab and incontinence of urine. Abdomen is distended.  I verify that I have reviewed and concur with the findings of the Utilization Review Committee and that the identified patient does not meet requirements for Inpatient status as outlined by [employer] guidelines. Observation is the identified level of care appropriate for  the patient.     9/29/21  seen per General surgery this am and she reports he would like a coloscopy, will defer to  for more plans. Patient reports only passing liquids no stool. Will have family bring in motegrity and resume    9/30/21  patient still without bowel movement despite ambulation, ivf and medication. Dr Webb at bedside and speaking with General surgery plans to take to OR for colonscopy tomorrow am       Goals of Care Treatment Preferences:  Code Status: Full Code      Consults:   Consults (From admission, onward)        Status Ordering Provider     Inpatient consult to General Surgery  Once        Provider:  Lupillo Rosenberg MD    Completed ARBEN WEBB III     Inpatient consult to Social Work/Case Management  Once        Provider:  (Not yet assigned)    ROBIN Godinez          * Fecal impaction  General surgery consulted, CT of abdomen and pelvis done continue treatment, encourage ambulation, continue IVF--add dulcolax supp today    9/29/21  patient reports only liquid per rectum no stool. General surgery follow defer for plans    9/30/21  general surgery to do colonscopy tomorrow am    10/1/21  patient to have colonscopy on Monday 10/4/21      A-fib  Rate controlled, continue cardiac monitor, on eliquis    10/1/21  hold eliquis until after colonscopy      Neuropathy  OT eval and treat        Final Active Diagnoses:    Diagnosis Date Noted POA    PRINCIPAL PROBLEM:  Fecal impaction [K56.41] 09/27/2021 Yes    Hypokalemia [E87.6] 09/30/2021 No    Neuropathy [G62.9] 09/28/2021 Yes    A-fib [I48.91] 09/28/2021 Yes    Hyperlipidemia [E78.5] 09/28/2021 Yes    OAB (overactive bladder) [N32.81] 09/28/2021 Yes    Weakness [R53.1] 09/28/2021 Yes    DVT prophylaxis [Z29.9] 09/28/2021 Not Applicable      Problems Resolved During this Admission:       Discharged Condition: good    Disposition: Home-Health Care c    Follow Up:   Follow-up Information     Arben Webb Iii,  MD In 2 weeks.    Specialty: Internal Medicine  Why: hospital follow up  Contact information:  [address] [place] [address] [phone]                       Patient Instructions:      Diet Cardiac   Order Comments: Clear liquid on Sunday and NPO after midnight     Notify your health care provider if you experience any of the following:  temperature >100.4     Notify your health care provider if you experience any of the following:  persistent nausea and vomiting or diarrhea     Notify your health care provider if you experience any of the following:  severe uncontrolled pain     Notify your health care provider if you experience any of the following:  redness, tenderness, or signs of infection (pain, swelling, redness, odor or green/yellow discharge around incision site)     Notify your health care provider if you experience any of the following:  difficulty breathing or increased cough     Notify your health care provider if you experience any of the following:  severe persistent headache     Notify your health care provider if you experience any of the following:  worsening rash     Notify your health care provider if you experience any of the following:  persistent dizziness, light-headedness, or visual disturbances     Notify your health care provider if you experience any of the following:  increased confusion or weakness     Notify your health care provider if you experience any of the following:     Activity as tolerated       Significant Diagnostic Studies: Labs:   CMP   Recent Labs   Lab 09/30/21  0517 10/01/21  0518    138   K 3.3* 3.6    104   CO2 29 28    132*   BUN 2* 3*   CREATININE 0.4* 0.5   CALCIUM 8.6* 8.7   PROT 6.6 6.7   ALBUMIN 3.3* 3.3*   BILITOT 0.6 0.5   ALKPHOS 91 95   AST 18 15   ALT 18 18   ANIONGAP 4* 6*   ESTGFRAFRICA >60.0 >60.0   EGFRNONAA >60.0 >60.0    and CBC   Recent Labs   Lab 09/30/21 0517 09/30/21  0517 10/01/21  0518   WBC 6.22  --  7.79    HGB 11.9*  --  12.6   HCT 35.7*   < > 36.8*     --  227    < > = values in this interval not displayed.       Pending Diagnostic Studies:     None         Medications:  Reconciled Home Medications:      Medication List      START taking these medications    linaCLOtide 290 mcg Cap capsule  Commonly known as: LINZESS  Take 1 capsule (290 mcg total) by mouth before breakfast.  Start taking on: October 2, 2021     sodium,potassium,mag sulfates 17.5-3.13-1.6 gram Solr  Commonly known as: SUPREP BOWEL PREP KIT  Take 177 mLs by mouth As instructed (ibottle at 1700h and another one at 2100h).        CONTINUE taking these medications    metoprolol tartrate 12.5 mg tablet  Commonly known as: LOPRESSOR  Take 12.5 mg by mouth 2 (two) times a day.     rosuvastatin 5 MG tablet  Commonly known as: CRESTOR  Take 5 mg by mouth every other day.        STOP taking these medications    apixaban 2.5 mg Tab  Commonly known as: ELIQUIS            Indwelling Lines/Drains at time of discharge:   Lines/Drains/Airways     None                 Time spent on the discharge of patient: 45 minutes         Jazmin Nicholson NP  Department of Hospital Medicine  [facility] - [facility] S[facility] 5th Floor

## 2021-10-01 NOTE — ASSESSMENT & PLAN NOTE
General surgery consulted, CT of abdomen and pelvis done continue treatment, encourage ambulation, continue IVF--add dulcolax supp today    9/29/21  patient reports only liquid per rectum no stool. General surgery follow defer for plans    9/30/21  general surgery to do colonscopy tomorrow am    10/1/21  patient to have colonscopy on Monday 10/4/21

## 2021-10-03 ENCOUNTER — ANESTHESIA EVENT (OUTPATIENT)
Dept: ENDOSCOPY | Facility: HOSPITAL | Age: 86
End: 2021-10-03
Payer: MEDICARE

## 2021-10-04 ENCOUNTER — ANESTHESIA (OUTPATIENT)
Dept: ENDOSCOPY | Facility: HOSPITAL | Age: 86
End: 2021-10-04
Payer: MEDICARE

## 2021-10-04 ENCOUNTER — HOSPITAL ENCOUNTER (OUTPATIENT)
Facility: HOSPITAL | Age: 86
Discharge: HOME | End: 2021-10-04
Attending: SURGERY | Admitting: SURGERY
Payer: MEDICARE

## 2021-10-04 VITALS
HEART RATE: 108 BPM | TEMPERATURE: 97 F | OXYGEN SATURATION: 99 % | RESPIRATION RATE: 18 BRPM | SYSTOLIC BLOOD PRESSURE: 111 MMHG | DIASTOLIC BLOOD PRESSURE: 59 MMHG

## 2021-10-04 DIAGNOSIS — K56.41 FECAL IMPACTION: Primary | ICD-10-CM

## 2021-10-04 LAB — SARS-COV-2 RDRP RESP QL NAA+PROBE: NEGATIVE

## 2021-10-04 PROCEDURE — 63600175 PHARM REV CODE 636 W HCPCS: Performed by: NURSE ANESTHETIST, CERTIFIED REGISTERED

## 2021-10-04 PROCEDURE — 37000009 HC ANESTHESIA EA ADD 15 MINS: Performed by: SURGERY

## 2021-10-04 PROCEDURE — D9220A PRA ANESTHESIA: ICD-10-PCS | Mod: ,,, | Performed by: NURSE ANESTHETIST, CERTIFIED REGISTERED

## 2021-10-04 PROCEDURE — U0002 COVID-19 LAB TEST NON-CDC: HCPCS | Performed by: SURGERY

## 2021-10-04 PROCEDURE — D9220A PRA ANESTHESIA: Mod: ,,, | Performed by: NURSE ANESTHETIST, CERTIFIED REGISTERED

## 2021-10-04 PROCEDURE — 45378 DIAGNOSTIC COLONOSCOPY: CPT | Performed by: SURGERY

## 2021-10-04 PROCEDURE — 37000008 HC ANESTHESIA 1ST 15 MINUTES: Performed by: SURGERY

## 2021-10-04 RX ORDER — LIDOCAINE HYDROCHLORIDE 10 MG/ML
1 INJECTION, SOLUTION EPIDURAL; INFILTRATION; INTRACAUDAL; PERINEURAL ONCE
Status: DISCONTINUED | OUTPATIENT
Start: 2021-10-04 | End: 2021-10-04 | Stop reason: HOSPADM

## 2021-10-04 RX ORDER — CHOLECALCIFEROL (VITAMIN D3) 25 MCG
1000 TABLET ORAL DAILY
COMMUNITY

## 2021-10-04 RX ORDER — FENTANYL CITRATE 50 UG/ML
INJECTION, SOLUTION INTRAMUSCULAR; INTRAVENOUS
Status: DISCONTINUED | OUTPATIENT
Start: 2021-10-04 | End: 2021-10-04

## 2021-10-04 RX ORDER — ASCORBIC ACID 500 MG
500 TABLET ORAL DAILY
COMMUNITY

## 2021-10-04 RX ORDER — PROPOFOL 10 MG/ML
INJECTION, EMULSION INTRAVENOUS
Status: DISCONTINUED | OUTPATIENT
Start: 2021-10-04 | End: 2021-10-04

## 2021-10-04 RX ORDER — SODIUM CHLORIDE, SODIUM LACTATE, POTASSIUM CHLORIDE, CALCIUM CHLORIDE 600; 310; 30; 20 MG/100ML; MG/100ML; MG/100ML; MG/100ML
INJECTION, SOLUTION INTRAVENOUS CONTINUOUS PRN
Status: DISCONTINUED | OUTPATIENT
Start: 2021-10-04 | End: 2021-10-04

## 2021-10-04 RX ORDER — SODIUM CHLORIDE 0.9 % (FLUSH) 0.9 %
10 SYRINGE (ML) INJECTION
Status: DISCONTINUED | OUTPATIENT
Start: 2021-10-04 | End: 2021-10-04 | Stop reason: HOSPADM

## 2021-10-04 RX ORDER — LIDOCAINE HCL/PF 100 MG/5ML
SYRINGE (ML) INTRAVENOUS
Status: DISCONTINUED | OUTPATIENT
Start: 2021-10-04 | End: 2021-10-04

## 2021-10-04 RX ORDER — MINOCYCLINE HYDROCHLORIDE 100 MG/1
100 TABLET ORAL 2 TIMES DAILY
COMMUNITY
End: 2022-07-26

## 2021-10-04 RX ORDER — PHENYLEPHRINE HYDROCHLORIDE 10 MG/ML
INJECTION INTRAVENOUS
Status: DISCONTINUED | OUTPATIENT
Start: 2021-10-04 | End: 2021-10-04

## 2021-10-04 RX ORDER — SODIUM CHLORIDE 9 MG/ML
INJECTION, SOLUTION INTRAVENOUS CONTINUOUS
Status: DISCONTINUED | OUTPATIENT
Start: 2021-10-04 | End: 2021-10-04 | Stop reason: HOSPADM

## 2021-10-04 RX ORDER — EPINEPHRINE 0.22MG
200 AEROSOL WITH ADAPTER (ML) INHALATION DAILY
COMMUNITY

## 2021-10-04 RX ADMIN — PROPOFOL 50 MG: 10 INJECTION, EMULSION INTRAVENOUS at 08:10

## 2021-10-04 RX ADMIN — PHENYLEPHRINE HYDROCHLORIDE 100 MCG: 10 INJECTION INTRAVENOUS at 08:10

## 2021-10-04 RX ADMIN — Medication 50 MG: at 08:10

## 2021-10-04 RX ADMIN — SODIUM CHLORIDE, SODIUM LACTATE, POTASSIUM CHLORIDE, AND CALCIUM CHLORIDE: 600; 310; 30; 20 INJECTION, SOLUTION INTRAVENOUS at 08:10

## 2021-10-04 RX ADMIN — FENTANYL CITRATE 100 MCG: 50 INJECTION, SOLUTION INTRAMUSCULAR; INTRAVENOUS at 08:10

## 2021-10-04 NOTE — INTERVAL H&P NOTE
The patient has been examined and the H&P has been reviewed:    The patient was discharged 3 days ago before undergoing a colonoscopy to finalize the workup regarding the recurrent fecal impaction says because she was on Eliquis at the time.  This has been stopped and we should be doing the procedure this morning.  She denies any problems in the interim from Friday to today.  There has been no change in her physical examination.        Active Hospital Problems    Diagnosis  POA    *Fecal impaction [K56.41]  Yes    Hypokalemia [E87.6]  No    Neuropathy [G62.9]  Yes    A-fib [I48.91]  Yes    Hyperlipidemia [E78.5]  Yes    OAB (overactive bladder) [N32.81]  Yes    Weakness [R53.1]  Yes    DVT prophylaxis [Z29.9]  Not Applicable      Resolved Hospital Problems   No resolved problems to display.

## 2021-12-01 DIAGNOSIS — M81.0 OP (OSTEOPOROSIS): Primary | ICD-10-CM

## 2021-12-07 ENCOUNTER — HOSPITAL ENCOUNTER (OUTPATIENT)
Dept: RADIOLOGY | Facility: HOSPITAL | Age: 86
Discharge: HOME | End: 2021-12-07
Attending: PAIN MEDICINE
Payer: MEDICARE

## 2021-12-07 DIAGNOSIS — M81.0 OP (OSTEOPOROSIS): ICD-10-CM

## 2021-12-07 PROCEDURE — A9503 TC99M MEDRONATE: HCPCS

## 2021-12-07 PROCEDURE — 78306 BONE IMAGING WHOLE BODY: CPT | Mod: TC

## 2022-03-30 ENCOUNTER — HOSPITAL ENCOUNTER (OUTPATIENT)
Dept: RADIOLOGY | Facility: HOSPITAL | Age: 87
Discharge: HOME | End: 2022-03-30
Attending: INTERNAL MEDICINE
Payer: MEDICARE

## 2022-03-30 VITALS — HEIGHT: 65 IN | BODY MASS INDEX: 19.49 KG/M2 | WEIGHT: 117 LBS

## 2022-03-30 DIAGNOSIS — Z12.31 ENCOUNTER FOR SCREENING MAMMOGRAM FOR MALIGNANT NEOPLASM OF BREAST: ICD-10-CM

## 2022-03-30 DIAGNOSIS — Z12.39 ENCOUNTER FOR SCREENING FOR MALIGNANT NEOPLASM OF BREAST, UNSPECIFIED SCREENING MODALITY: ICD-10-CM

## 2022-03-30 PROCEDURE — 77067 SCR MAMMO BI INCL CAD: CPT | Mod: TC

## 2022-07-19 ENCOUNTER — LAB VISIT (OUTPATIENT)
Dept: LAB | Facility: HOSPITAL | Age: 87
End: 2022-07-19
Attending: INTERNAL MEDICINE
Payer: MEDICARE

## 2022-07-19 DIAGNOSIS — E53.8 VITAMIN B 12 DEFICIENCY: ICD-10-CM

## 2022-07-19 DIAGNOSIS — E87.6 HYPOKALEMIA: ICD-10-CM

## 2022-07-19 DIAGNOSIS — R73.01 IMPAIRED FASTING GLUCOSE: ICD-10-CM

## 2022-07-19 DIAGNOSIS — E78.5 HYPERLIPIDEMIA, UNSPECIFIED HYPERLIPIDEMIA TYPE: ICD-10-CM

## 2022-07-19 DIAGNOSIS — R53.83 MALAISE AND FATIGUE: ICD-10-CM

## 2022-07-19 DIAGNOSIS — R53.81 MALAISE AND FATIGUE: ICD-10-CM

## 2022-07-19 LAB
ALBUMIN SERPL BCP-MCNC: 3.9 G/DL (ref 3.5–5.2)
ALP SERPL-CCNC: 50 U/L (ref 55–135)
ALT SERPL W/O P-5'-P-CCNC: 24 U/L (ref 10–44)
ANION GAP SERPL CALC-SCNC: 9 MMOL/L (ref 8–16)
AST SERPL-CCNC: 23 U/L (ref 10–40)
BASOPHILS # BLD AUTO: 0.05 K/UL (ref 0–0.2)
BASOPHILS NFR BLD: 0.8 % (ref 0–1.9)
BILIRUB SERPL-MCNC: 1.1 MG/DL (ref 0.1–1)
BUN SERPL-MCNC: 15 MG/DL (ref 8–23)
CALCIUM SERPL-MCNC: 8.9 MG/DL (ref 8.7–10.5)
CHLORIDE SERPL-SCNC: 104 MMOL/L (ref 95–110)
CHOLEST SERPL-MCNC: 147 MG/DL (ref 120–199)
CHOLEST/HDLC SERPL: 1.8 {RATIO} (ref 2–5)
CO2 SERPL-SCNC: 26 MMOL/L (ref 23–29)
CREAT SERPL-MCNC: 0.7 MG/DL (ref 0.5–1.4)
DIFFERENTIAL METHOD: ABNORMAL
EOSINOPHIL # BLD AUTO: 0.1 K/UL (ref 0–0.5)
EOSINOPHIL NFR BLD: 1.4 % (ref 0–8)
ERYTHROCYTE [DISTWIDTH] IN BLOOD BY AUTOMATED COUNT: 12.7 % (ref 11.5–14.5)
EST. GFR  (AFRICAN AMERICAN): >60 ML/MIN/1.73 M^2
EST. GFR  (NON AFRICAN AMERICAN): >60 ML/MIN/1.73 M^2
GLUCOSE SERPL-MCNC: 115 MG/DL (ref 70–110)
HCT VFR BLD AUTO: 38.5 % (ref 37–48.5)
HDLC SERPL-MCNC: 83 MG/DL (ref 40–75)
HDLC SERPL: 56.5 % (ref 20–50)
HGB BLD-MCNC: 12.8 G/DL (ref 12–16)
IMM GRANULOCYTES # BLD AUTO: 0.02 K/UL (ref 0–0.04)
IMM GRANULOCYTES NFR BLD AUTO: 0.3 % (ref 0–0.5)
LDLC SERPL CALC-MCNC: 50.6 MG/DL (ref 63–159)
LYMPHOCYTES # BLD AUTO: 2.2 K/UL (ref 1–4.8)
LYMPHOCYTES NFR BLD: 34.1 % (ref 18–48)
MCH RBC QN AUTO: 31.8 PG (ref 27–31)
MCHC RBC AUTO-ENTMCNC: 33.2 G/DL (ref 32–36)
MCV RBC AUTO: 96 FL (ref 82–98)
MONOCYTES # BLD AUTO: 0.8 K/UL (ref 0.3–1)
MONOCYTES NFR BLD: 12.2 % (ref 4–15)
NEUTROPHILS # BLD AUTO: 3.4 K/UL (ref 1.8–7.7)
NEUTROPHILS NFR BLD: 51.2 % (ref 38–73)
NONHDLC SERPL-MCNC: 64 MG/DL
NRBC BLD-RTO: 0 /100 WBC
PLATELET # BLD AUTO: 186 K/UL (ref 150–450)
PMV BLD AUTO: 10.1 FL (ref 9.2–12.9)
POTASSIUM SERPL-SCNC: 4.2 MMOL/L (ref 3.5–5.1)
PROT SERPL-MCNC: 7.4 G/DL (ref 6–8.4)
RBC # BLD AUTO: 4.03 M/UL (ref 4–5.4)
SODIUM SERPL-SCNC: 139 MMOL/L (ref 136–145)
T4 FREE SERPL-MCNC: 1.15 NG/DL (ref 0.71–1.51)
TRIGL SERPL-MCNC: 67 MG/DL (ref 30–150)
TSH SERPL DL<=0.005 MIU/L-ACNC: 2.93 UIU/ML (ref 0.4–4)
VIT B12 SERPL-MCNC: 1027 PG/ML (ref 210–950)
WBC # BLD AUTO: 6.57 K/UL (ref 3.9–12.7)

## 2022-07-19 PROCEDURE — 36415 COLL VENOUS BLD VENIPUNCTURE: CPT | Performed by: INTERNAL MEDICINE

## 2022-07-19 PROCEDURE — 82607 VITAMIN B-12: CPT | Performed by: INTERNAL MEDICINE

## 2022-07-19 PROCEDURE — 84443 ASSAY THYROID STIM HORMONE: CPT | Performed by: INTERNAL MEDICINE

## 2022-07-19 PROCEDURE — 80061 LIPID PANEL: CPT | Performed by: INTERNAL MEDICINE

## 2022-07-19 PROCEDURE — 80053 COMPREHEN METABOLIC PANEL: CPT | Performed by: INTERNAL MEDICINE

## 2022-07-19 PROCEDURE — 84439 ASSAY OF FREE THYROXINE: CPT | Performed by: INTERNAL MEDICINE

## 2022-07-19 PROCEDURE — 85025 COMPLETE CBC W/AUTO DIFF WBC: CPT | Performed by: INTERNAL MEDICINE

## 2022-07-27 PROBLEM — D64.9 ANEMIA: Status: ACTIVE | Noted: 2022-07-27

## 2022-07-27 PROBLEM — M81.0 OSTEOPOROSIS, POSTMENOPAUSAL: Status: ACTIVE | Noted: 2022-07-27

## 2022-07-27 PROBLEM — N95.9 MENOPAUSAL AND POSTMENOPAUSAL DISORDER: Status: ACTIVE | Noted: 2022-07-27

## 2022-07-27 PROBLEM — M50.90 DISORDER OF INTERVERTEBRAL DISC OF CERVICAL SPINE: Status: ACTIVE | Noted: 2022-07-27

## 2022-07-27 PROBLEM — H61.20 CERUMEN IMPACTION: Status: ACTIVE | Noted: 2022-07-27

## 2022-07-27 PROBLEM — R73.01 IMPAIRED FASTING GLUCOSE: Status: ACTIVE | Noted: 2022-07-27

## 2022-07-27 PROBLEM — G54.9 NERVE ROOT DISORDER: Status: ACTIVE | Noted: 2022-07-27

## 2022-07-27 PROBLEM — H26.9 CATARACT: Status: ACTIVE | Noted: 2022-07-27

## 2022-07-27 PROBLEM — M15.9 OSTEOARTHRITIS, MULTIPLE SITES: Status: ACTIVE | Noted: 2022-07-27

## 2022-07-27 PROBLEM — E55.9 VITAMIN D DEFICIENCY: Status: ACTIVE | Noted: 2022-07-27

## 2022-07-27 PROBLEM — R42 VERTIGO: Status: ACTIVE | Noted: 2022-07-27

## 2022-07-27 PROBLEM — S72.002A CLOSED FRACTURE OF LEFT HIP: Status: ACTIVE | Noted: 2022-07-27

## 2022-07-27 PROBLEM — I95.1 ORTHOSTATIC HYPOTENSION: Status: ACTIVE | Noted: 2021-05-12

## 2022-07-27 PROBLEM — I25.10 ARTERIOSCLEROSIS OF CORONARY ARTERY: Status: ACTIVE | Noted: 2022-07-27

## 2022-07-27 PROBLEM — R06.00 DYSPNEA: Status: ACTIVE | Noted: 2022-07-27

## 2022-07-27 PROBLEM — R79.89 ABNORMAL C-REACTIVE PROTEIN: Status: ACTIVE | Noted: 2022-07-27

## 2022-08-04 ENCOUNTER — LAB VISIT (OUTPATIENT)
Dept: LAB | Facility: HOSPITAL | Age: 87
End: 2022-08-04
Attending: INTERNAL MEDICINE
Payer: MEDICARE

## 2022-08-04 DIAGNOSIS — R39.9 UTI SYMPTOMS: ICD-10-CM

## 2022-08-04 DIAGNOSIS — R31.9 HEMATURIA, UNSPECIFIED TYPE: ICD-10-CM

## 2022-08-04 PROCEDURE — 87086 URINE CULTURE/COLONY COUNT: CPT | Performed by: INTERNAL MEDICINE

## 2022-08-04 PROCEDURE — 87077 CULTURE AEROBIC IDENTIFY: CPT | Performed by: INTERNAL MEDICINE

## 2022-08-04 PROCEDURE — 87186 SC STD MICRODIL/AGAR DIL: CPT | Performed by: INTERNAL MEDICINE

## 2022-08-04 PROCEDURE — 87088 URINE BACTERIA CULTURE: CPT | Performed by: INTERNAL MEDICINE

## 2022-08-07 LAB — BACTERIA UR CULT: ABNORMAL

## 2023-02-01 PROBLEM — Z79.899 DVT PROPHYLAXIS: Status: RESOLVED | Noted: 2021-09-28 | Resolved: 2023-02-01

## 2023-04-11 ENCOUNTER — HOSPITAL ENCOUNTER (OUTPATIENT)
Dept: RADIOLOGY | Facility: HOSPITAL | Age: 88
Discharge: HOME | End: 2023-04-11
Attending: INTERNAL MEDICINE
Payer: MEDICARE

## 2023-04-11 DIAGNOSIS — Z12.31 SCREENING MAMMOGRAM FOR HIGH-RISK PATIENT: ICD-10-CM

## 2023-04-11 PROCEDURE — 77067 SCR MAMMO BI INCL CAD: CPT | Mod: TC,52

## 2023-07-27 ENCOUNTER — LAB VISIT (OUTPATIENT)
Dept: LAB | Facility: HOSPITAL | Age: 88
End: 2023-07-27
Attending: INTERNAL MEDICINE
Payer: MEDICARE

## 2023-07-27 DIAGNOSIS — I48.20 CHRONIC ATRIAL FIBRILLATION: ICD-10-CM

## 2023-07-27 DIAGNOSIS — E55.9 VITAMIN D DEFICIENCY: ICD-10-CM

## 2023-07-27 DIAGNOSIS — I25.10 ARTERIOSCLEROSIS OF CORONARY ARTERY: ICD-10-CM

## 2023-07-27 DIAGNOSIS — E78.2 MIXED HYPERLIPIDEMIA: ICD-10-CM

## 2023-07-27 DIAGNOSIS — M81.0 OSTEOPOROSIS, UNSPECIFIED OSTEOPOROSIS TYPE, UNSPECIFIED PATHOLOGICAL FRACTURE PRESENCE: ICD-10-CM

## 2023-07-27 LAB
25(OH)D3+25(OH)D2 SERPL-MCNC: 81 NG/ML (ref 30–96)
ALBUMIN SERPL BCP-MCNC: 3.8 G/DL (ref 3.5–5.2)
ALP SERPL-CCNC: 47 U/L (ref 55–135)
ALT SERPL W/O P-5'-P-CCNC: 23 U/L (ref 10–44)
ANION GAP SERPL CALC-SCNC: 8 MMOL/L (ref 8–16)
AST SERPL-CCNC: 23 U/L (ref 10–40)
BASOPHILS # BLD AUTO: 0.05 K/UL (ref 0–0.2)
BASOPHILS NFR BLD: 0.7 % (ref 0–1.9)
BILIRUB SERPL-MCNC: 0.7 MG/DL (ref 0.1–1)
BUN SERPL-MCNC: 20 MG/DL (ref 8–23)
CALCIUM SERPL-MCNC: 9.3 MG/DL (ref 8.7–10.5)
CHLORIDE SERPL-SCNC: 103 MMOL/L (ref 95–110)
CHOLEST SERPL-MCNC: 130 MG/DL (ref 120–199)
CHOLEST/HDLC SERPL: 1.7 {RATIO} (ref 2–5)
CO2 SERPL-SCNC: 24 MMOL/L (ref 23–29)
CREAT SERPL-MCNC: 0.6 MG/DL (ref 0.5–1.4)
DIFFERENTIAL METHOD: ABNORMAL
EOSINOPHIL # BLD AUTO: 0.1 K/UL (ref 0–0.5)
EOSINOPHIL NFR BLD: 1.8 % (ref 0–8)
ERYTHROCYTE [DISTWIDTH] IN BLOOD BY AUTOMATED COUNT: 13.2 % (ref 11.5–14.5)
EST. GFR  (NO RACE VARIABLE): >60 ML/MIN/1.73 M^2
GLUCOSE SERPL-MCNC: 95 MG/DL (ref 70–110)
HCT VFR BLD AUTO: 36.6 % (ref 37–48.5)
HDLC SERPL-MCNC: 76 MG/DL (ref 40–75)
HDLC SERPL: 58.5 % (ref 20–50)
HGB BLD-MCNC: 12.3 G/DL (ref 12–16)
IMM GRANULOCYTES # BLD AUTO: 0.01 K/UL (ref 0–0.04)
IMM GRANULOCYTES NFR BLD AUTO: 0.1 % (ref 0–0.5)
LDLC SERPL CALC-MCNC: 44.6 MG/DL (ref 63–159)
LYMPHOCYTES # BLD AUTO: 2.2 K/UL (ref 1–4.8)
LYMPHOCYTES NFR BLD: 32.1 % (ref 18–48)
MCH RBC QN AUTO: 31.9 PG (ref 27–31)
MCHC RBC AUTO-ENTMCNC: 33.6 G/DL (ref 32–36)
MCV RBC AUTO: 95 FL (ref 82–98)
MONOCYTES # BLD AUTO: 0.9 K/UL (ref 0.3–1)
MONOCYTES NFR BLD: 12.9 % (ref 4–15)
NEUTROPHILS # BLD AUTO: 3.5 K/UL (ref 1.8–7.7)
NEUTROPHILS NFR BLD: 52.4 % (ref 38–73)
NONHDLC SERPL-MCNC: 54 MG/DL
NRBC BLD-RTO: 0 /100 WBC
PLATELET # BLD AUTO: 198 K/UL (ref 150–450)
PMV BLD AUTO: 10.5 FL (ref 9.2–12.9)
POTASSIUM SERPL-SCNC: 4.3 MMOL/L (ref 3.5–5.1)
PROT SERPL-MCNC: 7.6 G/DL (ref 6–8.4)
RBC # BLD AUTO: 3.86 M/UL (ref 4–5.4)
SODIUM SERPL-SCNC: 135 MMOL/L (ref 136–145)
TRIGL SERPL-MCNC: 47 MG/DL (ref 30–150)
TSH SERPL DL<=0.005 MIU/L-ACNC: 2.18 UIU/ML (ref 0.4–4)
WBC # BLD AUTO: 6.75 K/UL (ref 3.9–12.7)

## 2023-07-27 PROCEDURE — 80053 COMPREHEN METABOLIC PANEL: CPT | Performed by: INTERNAL MEDICINE

## 2023-07-27 PROCEDURE — 80061 LIPID PANEL: CPT | Performed by: INTERNAL MEDICINE

## 2023-07-27 PROCEDURE — 82306 VITAMIN D 25 HYDROXY: CPT | Performed by: INTERNAL MEDICINE

## 2023-07-27 PROCEDURE — 84443 ASSAY THYROID STIM HORMONE: CPT | Performed by: INTERNAL MEDICINE

## 2023-07-27 PROCEDURE — 85025 COMPLETE CBC W/AUTO DIFF WBC: CPT | Performed by: INTERNAL MEDICINE

## 2023-07-27 PROCEDURE — 36415 COLL VENOUS BLD VENIPUNCTURE: CPT | Performed by: INTERNAL MEDICINE

## 2023-08-03 ENCOUNTER — APPOINTMENT (OUTPATIENT)
Dept: LAB | Facility: HOSPITAL | Age: 88
End: 2023-08-03
Attending: INTERNAL MEDICINE
Payer: MEDICARE

## 2024-05-07 ENCOUNTER — HOSPITAL ENCOUNTER (OUTPATIENT)
Dept: RADIOLOGY | Facility: HOSPITAL | Age: 89
Discharge: HOME | End: 2024-05-07
Attending: INTERNAL MEDICINE
Payer: MEDICARE

## 2024-05-07 VITALS — HEIGHT: 65 IN | WEIGHT: 117 LBS | BODY MASS INDEX: 19.49 KG/M2

## 2024-05-07 DIAGNOSIS — Z00.00 ROUTINE GENERAL MEDICAL EXAMINATION AT A HEALTH CARE FACILITY: ICD-10-CM

## 2024-05-07 DIAGNOSIS — Z12.31 SCREENING MAMMOGRAM FOR BREAST CANCER: ICD-10-CM

## 2024-05-07 PROCEDURE — 77067 SCR MAMMO BI INCL CAD: CPT | Mod: TC,52

## 2024-05-07 PROCEDURE — 77063 BREAST TOMOSYNTHESIS BI: CPT | Mod: TC,52

## 2024-08-05 ENCOUNTER — LAB VISIT (OUTPATIENT)
Dept: LAB | Facility: HOSPITAL | Age: 89
End: 2024-08-05
Attending: INTERNAL MEDICINE
Payer: MEDICARE

## 2024-08-05 DIAGNOSIS — I48.0 PAROXYSMAL ATRIAL FIBRILLATION: ICD-10-CM

## 2024-08-05 DIAGNOSIS — R73.01 IMPAIRED FASTING GLUCOSE: ICD-10-CM

## 2024-08-05 DIAGNOSIS — E55.9 VITAMIN D DEFICIENCY: ICD-10-CM

## 2024-08-05 DIAGNOSIS — M81.0 OSTEOPOROSIS, POSTMENOPAUSAL: ICD-10-CM

## 2024-08-05 DIAGNOSIS — E78.5 HLD (HYPERLIPIDEMIA): ICD-10-CM

## 2024-08-05 LAB
25(OH)D3+25(OH)D2 SERPL-MCNC: 87 NG/ML (ref 30–96)
ALBUMIN SERPL BCP-MCNC: 3.9 G/DL (ref 3.5–5.2)
ALP SERPL-CCNC: 42 U/L (ref 55–135)
ALT SERPL W/O P-5'-P-CCNC: 25 U/L (ref 10–44)
ANION GAP SERPL CALC-SCNC: 11 MMOL/L (ref 3–11)
AST SERPL-CCNC: 29 U/L (ref 10–40)
BASOPHILS # BLD AUTO: 0.05 K/UL (ref 0–0.2)
BASOPHILS NFR BLD: 0.8 % (ref 0–1.9)
BILIRUB SERPL-MCNC: 1 MG/DL (ref 0.1–1)
BUN SERPL-MCNC: 20 MG/DL (ref 8–23)
CALCIUM SERPL-MCNC: 9.2 MG/DL (ref 8.7–10.5)
CHLORIDE SERPL-SCNC: 99 MMOL/L (ref 95–110)
CHOLEST SERPL-MCNC: 129 MG/DL (ref 120–199)
CHOLEST/HDLC SERPL: 1.6 {RATIO} (ref 2–5)
CO2 SERPL-SCNC: 26 MMOL/L (ref 23–29)
CREAT SERPL-MCNC: 0.9 MG/DL (ref 0.5–1.4)
DIFFERENTIAL METHOD BLD: ABNORMAL
EOSINOPHIL # BLD AUTO: 0.1 K/UL (ref 0–0.5)
EOSINOPHIL NFR BLD: 1.6 % (ref 0–8)
ERYTHROCYTE [DISTWIDTH] IN BLOOD BY AUTOMATED COUNT: 13.2 % (ref 11.5–14.5)
EST. GFR  (NO RACE VARIABLE): >60 ML/MIN/1.73 M^2
GLUCOSE SERPL-MCNC: 114 MG/DL (ref 70–110)
HCT VFR BLD AUTO: 39.8 % (ref 37–48.5)
HDLC SERPL-MCNC: 79 MG/DL (ref 40–75)
HDLC SERPL: 61.2 % (ref 20–50)
HGB BLD-MCNC: 13.2 G/DL (ref 12–16)
IMM GRANULOCYTES # BLD AUTO: 0.02 K/UL (ref 0–0.04)
IMM GRANULOCYTES NFR BLD AUTO: 0.3 % (ref 0–0.5)
LDLC SERPL CALC-MCNC: 38.4 MG/DL (ref 63–159)
LYMPHOCYTES # BLD AUTO: 2 K/UL (ref 1–4.8)
LYMPHOCYTES NFR BLD: 31.5 % (ref 18–48)
MCH RBC QN AUTO: 32.1 PG (ref 27–31)
MCHC RBC AUTO-ENTMCNC: 33.2 G/DL (ref 32–36)
MCV RBC AUTO: 97 FL (ref 82–98)
MONOCYTES # BLD AUTO: 0.8 K/UL (ref 0.3–1)
MONOCYTES NFR BLD: 12.5 % (ref 4–15)
NEUTROPHILS # BLD AUTO: 3.4 K/UL (ref 1.8–7.7)
NEUTROPHILS NFR BLD: 53.3 % (ref 38–73)
NONHDLC SERPL-MCNC: 50 MG/DL
NRBC BLD-RTO: 0 /100 WBC
PLATELET # BLD AUTO: 174 K/UL (ref 150–450)
PMV BLD AUTO: 10.6 FL (ref 9.2–12.9)
POTASSIUM SERPL-SCNC: 4.4 MMOL/L (ref 3.5–5.1)
PROT SERPL-MCNC: 7.5 G/DL (ref 6–8.4)
RBC # BLD AUTO: 4.11 M/UL (ref 4–5.4)
SODIUM SERPL-SCNC: 136 MMOL/L (ref 136–145)
TRIGL SERPL-MCNC: 58 MG/DL (ref 30–150)
TSH SERPL DL<=0.005 MIU/L-ACNC: 2.97 UIU/ML (ref 0.4–4)
WBC # BLD AUTO: 6.38 K/UL (ref 3.9–12.7)

## 2024-08-05 PROCEDURE — 85025 COMPLETE CBC W/AUTO DIFF WBC: CPT | Performed by: INTERNAL MEDICINE

## 2024-08-05 PROCEDURE — 36415 COLL VENOUS BLD VENIPUNCTURE: CPT | Performed by: INTERNAL MEDICINE

## 2024-08-05 PROCEDURE — 80053 COMPREHEN METABOLIC PANEL: CPT | Performed by: INTERNAL MEDICINE

## 2024-08-05 PROCEDURE — 84443 ASSAY THYROID STIM HORMONE: CPT | Performed by: INTERNAL MEDICINE

## 2024-08-05 PROCEDURE — 80061 LIPID PANEL: CPT | Performed by: INTERNAL MEDICINE

## 2024-08-05 PROCEDURE — 82306 VITAMIN D 25 HYDROXY: CPT | Performed by: INTERNAL MEDICINE

## 2025-02-17 PROBLEM — M46.1 SACROILIITIS, NOT ELSEWHERE CLASSIFIED: Status: ACTIVE | Noted: 2025-02-17

## 2025-02-27 ENCOUNTER — INFUSION (OUTPATIENT)
Dept: INFUSION THERAPY | Facility: HOSPITAL | Age: 89
End: 2025-02-27
Attending: INTERNAL MEDICINE
Payer: MEDICARE

## 2025-02-27 VITALS
RESPIRATION RATE: 18 BRPM | DIASTOLIC BLOOD PRESSURE: 67 MMHG | OXYGEN SATURATION: 99 % | HEART RATE: 72 BPM | SYSTOLIC BLOOD PRESSURE: 142 MMHG | TEMPERATURE: 98 F

## 2025-02-27 DIAGNOSIS — M81.0 OSTEOPOROSIS, POSTMENOPAUSAL: ICD-10-CM

## 2025-02-27 DIAGNOSIS — S72.002S CLOSED FRACTURE OF LEFT HIP, SEQUELA: Primary | ICD-10-CM

## 2025-02-27 PROCEDURE — 96372 THER/PROPH/DIAG INJ SC/IM: CPT

## 2025-02-27 PROCEDURE — 63600175 PHARM REV CODE 636 W HCPCS: Mod: JZ | Performed by: INTERNAL MEDICINE

## 2025-02-27 RX ADMIN — DENOSUMAB 60 MG: 60 INJECTION SUBCUTANEOUS at 10:02

## 2025-02-27 NOTE — PROGRESS NOTES
PATIENT HERE FOR PROLIA INJECTION    1043 PROLIA 60 MG GIVEN SUB Q IN  LEFT       ARM  PATIENT VOICES NO C/O    DISCHARGED IN STABLE CONDITION  AMBULATORY WITH CAREGIVER

## 2025-05-07 ENCOUNTER — RESULTS FOLLOW-UP (OUTPATIENT)
Dept: HEPATOLOGY | Facility: HOSPITAL | Age: 89
End: 2025-05-07

## 2025-05-27 ENCOUNTER — LAB VISIT (OUTPATIENT)
Dept: LAB | Facility: HOSPITAL | Age: 89
End: 2025-05-27
Attending: INTERNAL MEDICINE
Payer: MEDICARE

## 2025-05-27 ENCOUNTER — RESULTS FOLLOW-UP (OUTPATIENT)
Dept: HEPATOLOGY | Facility: HOSPITAL | Age: 89
End: 2025-05-27

## 2025-05-27 DIAGNOSIS — R73.01 IMPAIRED FASTING GLUCOSE: ICD-10-CM

## 2025-05-27 DIAGNOSIS — M15.0 PRIMARY OSTEOARTHRITIS INVOLVING MULTIPLE JOINTS: ICD-10-CM

## 2025-05-27 DIAGNOSIS — I48.0 PAROXYSMAL ATRIAL FIBRILLATION: ICD-10-CM

## 2025-05-27 DIAGNOSIS — I25.10 ARTERIOSCLEROSIS OF CORONARY ARTERY: ICD-10-CM

## 2025-05-27 LAB
ABSOLUTE EOSINOPHIL (OHS): 0.12 K/UL
ABSOLUTE MONOCYTE (OHS): 0.93 K/UL (ref 0.3–1)
ABSOLUTE NEUTROPHIL COUNT (OHS): 5.05 K/UL (ref 1.8–7.7)
ALBUMIN SERPL BCP-MCNC: 4 G/DL (ref 3.5–5.2)
ALP SERPL-CCNC: 42 UNIT/L (ref 40–150)
ALT SERPL W/O P-5'-P-CCNC: 40 UNIT/L (ref 10–44)
ANION GAP (OHS): 7 MMOL/L (ref 8–16)
AST SERPL-CCNC: 45 UNIT/L (ref 11–45)
BASOPHILS # BLD AUTO: 0.04 K/UL
BASOPHILS NFR BLD AUTO: 0.5 %
BILIRUB SERPL-MCNC: 0.7 MG/DL (ref 0.1–1)
BUN SERPL-MCNC: 18 MG/DL (ref 8–23)
CALCIUM SERPL-MCNC: 9.2 MG/DL (ref 8.7–10.5)
CHLORIDE SERPL-SCNC: 92 MMOL/L (ref 95–110)
CO2 SERPL-SCNC: 27 MMOL/L (ref 23–29)
CREAT SERPL-MCNC: 0.6 MG/DL (ref 0.5–1.4)
ERYTHROCYTE [DISTWIDTH] IN BLOOD BY AUTOMATED COUNT: 12.7 % (ref 11.5–14.5)
GFR SERPLBLD CREATININE-BSD FMLA CKD-EPI: >60 ML/MIN/1.73/M2
GLUCOSE SERPL-MCNC: 102 MG/DL (ref 70–110)
HCT VFR BLD AUTO: 38.3 % (ref 37–48.5)
HGB BLD-MCNC: 13.1 GM/DL (ref 12–16)
HOLD SPECIMEN: NORMAL
IMM GRANULOCYTES # BLD AUTO: 0.04 K/UL (ref 0–0.04)
IMM GRANULOCYTES NFR BLD AUTO: 0.5 % (ref 0–0.5)
LYMPHOCYTES # BLD AUTO: 1.48 K/UL (ref 1–4.8)
MAGNESIUM SERPL-MCNC: 2.1 MG/DL (ref 1.6–2.6)
MCH RBC QN AUTO: 32.3 PG (ref 27–31)
MCHC RBC AUTO-ENTMCNC: 34.2 G/DL (ref 32–36)
MCV RBC AUTO: 94 FL (ref 82–98)
NUCLEATED RBC (/100WBC) (OHS): 0 /100 WBC
PLATELET # BLD AUTO: 218 K/UL (ref 150–450)
PMV BLD AUTO: 10 FL (ref 9.2–12.9)
POTASSIUM SERPL-SCNC: 4.8 MMOL/L (ref 3.5–5.1)
PROT SERPL-MCNC: 7.2 GM/DL (ref 6–8.4)
RBC # BLD AUTO: 4.06 M/UL (ref 4–5.4)
RELATIVE EOSINOPHIL (OHS): 1.6 %
RELATIVE LYMPHOCYTE (OHS): 19.3 % (ref 18–48)
RELATIVE MONOCYTE (OHS): 12.1 % (ref 4–15)
RELATIVE NEUTROPHIL (OHS): 66 % (ref 38–73)
SODIUM SERPL-SCNC: 126 MMOL/L (ref 136–145)
TSH SERPL-ACNC: 2.03 UIU/ML (ref 0.4–4)
WBC # BLD AUTO: 7.66 K/UL (ref 3.9–12.7)

## 2025-05-27 PROCEDURE — 36415 COLL VENOUS BLD VENIPUNCTURE: CPT

## 2025-05-27 PROCEDURE — 82310 ASSAY OF CALCIUM: CPT

## 2025-05-27 PROCEDURE — 84443 ASSAY THYROID STIM HORMONE: CPT

## 2025-05-27 PROCEDURE — 85025 COMPLETE CBC W/AUTO DIFF WBC: CPT

## 2025-05-27 PROCEDURE — 83735 ASSAY OF MAGNESIUM: CPT

## 2025-07-24 PROBLEM — F03.911 DEMENTIA WITH AGITATION: Status: ACTIVE | Noted: 2025-07-24

## (undated) DEVICE — SEE MEDLINE ITEM 153215

## (undated) DEVICE — SYR SLIP TIP 60 CC DISP

## (undated) DEVICE — TUBE TORRENT IRRIGATION

## (undated) DEVICE — LINER SUCTION CANNISTER REGUGA

## (undated) DEVICE — TUBE SUC UNIVERSAL .25XIN 6FT

## (undated) DEVICE — TUBING OXYGEN CONNECT BUBBLE

## (undated) DEVICE — ELECTRODE FOAM 535 TEARDROP

## (undated) DEVICE — KIT BIOGUARD AIR WTR SUC VALVE

## (undated) DEVICE — TUBING SUC UNIV W/CONN 12FT

## (undated) DEVICE — SOL IRRI STRL WATER 1000ML

## (undated) DEVICE — BASIN EMESIS GRAPHITE 500ML

## (undated) DEVICE — GOWN SURGICAL BRTHBL XL

## (undated) DEVICE — JELLY LUBRICATING STERILE 2OZ

## (undated) DEVICE — SPONGE DRY VIA GREEN

## (undated) DEVICE — CONNECTOR TORRENT SCP OLYMPUS

## (undated) DEVICE — KIT VIA CUSTOM PROCEDURE

## (undated) DEVICE — UNDERPAD DISPOSABLE 30X30IN

## (undated) DEVICE — SYS AQUASHIELD WATTER BOTTLE